# Patient Record
Sex: FEMALE | Race: BLACK OR AFRICAN AMERICAN | Employment: FULL TIME | URBAN - METROPOLITAN AREA
[De-identification: names, ages, dates, MRNs, and addresses within clinical notes are randomized per-mention and may not be internally consistent; named-entity substitution may affect disease eponyms.]

---

## 2020-07-17 ENCOUNTER — HOSPITAL ENCOUNTER (EMERGENCY)
Age: 35
Discharge: HOME | End: 2020-07-17
Attending: EMERGENCY MEDICINE
Payer: COMMERCIAL

## 2020-07-17 VITALS
DIASTOLIC BLOOD PRESSURE: 74 MMHG | OXYGEN SATURATION: 99 % | SYSTOLIC BLOOD PRESSURE: 127 MMHG | HEART RATE: 86 BPM | BODY MASS INDEX: 48.18 KG/M2 | TEMPERATURE: 98.8 F | HEIGHT: 61 IN | RESPIRATION RATE: 16 BRPM

## 2020-07-17 LAB
ANION GAP SERPL CALCULATED.3IONS-SCNC: 10 MMOL/L (ref 7–16)
BUN BLDV-MCNC: 11 MG/DL (ref 6–20)
CALCIUM SERPL-MCNC: 9 MG/DL (ref 8.6–10.2)
CHLORIDE BLD-SCNC: 105 MMOL/L (ref 98–107)
CO2: 25 MMOL/L (ref 22–29)
CREAT SERPL-MCNC: 0.6 MG/DL (ref 0.5–1)
D DIMER: <200 NG/ML DDU
GFR AFRICAN AMERICAN: >60
GFR NON-AFRICAN AMERICAN: >60 ML/MIN/1.73
GLUCOSE BLD-MCNC: 113 MG/DL (ref 74–99)
HCG(URINE) PREGNANCY TEST: NEGATIVE
POTASSIUM REFLEX MAGNESIUM: 3.9 MMOL/L (ref 3.5–5)
SODIUM BLD-SCNC: 140 MMOL/L (ref 132–146)

## 2020-07-17 PROCEDURE — 85025 COMPLETE CBC W/AUTO DIFF WBC: CPT

## 2020-07-17 PROCEDURE — 85378 FIBRIN DEGRADE SEMIQUANT: CPT

## 2020-07-17 PROCEDURE — 80048 BASIC METABOLIC PNL TOTAL CA: CPT

## 2020-07-17 PROCEDURE — 81025 URINE PREGNANCY TEST: CPT

## 2020-07-17 PROCEDURE — 99283 EMERGENCY DEPT VISIT LOW MDM: CPT

## 2020-07-17 PROCEDURE — 36415 COLL VENOUS BLD VENIPUNCTURE: CPT

## 2020-07-17 PROCEDURE — 6370000000 HC RX 637 (ALT 250 FOR IP): Performed by: EMERGENCY MEDICINE

## 2020-07-17 RX ORDER — DOXYCYCLINE HYCLATE 100 MG/1
100 CAPSULE ORAL 2 TIMES DAILY
COMMUNITY
End: 2021-04-22

## 2020-07-17 RX ORDER — FLUTICASONE PROPIONATE 50 MCG
2 SPRAY, SUSPENSION (ML) NASAL DAILY
COMMUNITY
End: 2021-04-22

## 2020-07-17 RX ORDER — CHOLECALCIFEROL (VITAMIN D3) 125 MCG
500 CAPSULE ORAL DAILY
COMMUNITY

## 2020-07-17 RX ORDER — MESALAMINE 1.2 G/1
1200 TABLET, DELAYED RELEASE ORAL
COMMUNITY
End: 2021-04-22

## 2020-07-17 RX ORDER — ACETAMINOPHEN 500 MG
1000 TABLET ORAL ONCE
Status: COMPLETED | OUTPATIENT
Start: 2020-07-17 | End: 2020-07-17

## 2020-07-17 RX ORDER — TRIAMCINOLONE ACETONIDE 1 MG/G
CREAM TOPICAL 2 TIMES DAILY
COMMUNITY
End: 2021-04-22

## 2020-07-17 RX ORDER — HYDROCODONE BITARTRATE AND ACETAMINOPHEN 5; 325 MG/1; MG/1
2 TABLET ORAL ONCE
Status: DISCONTINUED | OUTPATIENT
Start: 2020-07-17 | End: 2020-07-17

## 2020-07-17 RX ADMIN — ACETAMINOPHEN 1000 MG: 500 TABLET ORAL at 22:45

## 2020-07-17 ASSESSMENT — PAIN SCALES - GENERAL
PAINLEVEL_OUTOF10: 8
PAINLEVEL_OUTOF10: 6

## 2020-07-17 ASSESSMENT — PAIN DESCRIPTION - ORIENTATION: ORIENTATION: RIGHT

## 2020-07-17 ASSESSMENT — PAIN DESCRIPTION - LOCATION: LOCATION: ARM

## 2020-07-18 LAB
BASOPHILS ABSOLUTE: 0 E9/L (ref 0–0.2)
BASOPHILS RELATIVE PERCENT: 0 % (ref 0–2)
EOSINOPHILS ABSOLUTE: 0.18 E9/L (ref 0.05–0.5)
EOSINOPHILS RELATIVE PERCENT: 1 % (ref 0–6)
HCT VFR BLD CALC: 34.3 % (ref 34–48)
HEMOGLOBIN: 11.1 G/DL (ref 11.5–15.5)
LYMPHOCYTES ABSOLUTE: 5.34 E9/L (ref 1.5–4)
LYMPHOCYTES RELATIVE PERCENT: 30 % (ref 20–42)
MCH RBC QN AUTO: 25.9 PG (ref 26–35)
MCHC RBC AUTO-ENTMCNC: 32.4 % (ref 32–34.5)
MCV RBC AUTO: 80.1 FL (ref 80–99.9)
MONOCYTES ABSOLUTE: 1.07 E9/L (ref 0.1–0.95)
MONOCYTES RELATIVE PERCENT: 6 % (ref 2–12)
NEUTROPHILS ABSOLUTE: 11.21 E9/L (ref 1.8–7.3)
NEUTROPHILS RELATIVE PERCENT: 63 % (ref 43–80)
PDW BLD-RTO: 13.4 FL (ref 11.5–15)
PLATELET # BLD: 336 E9/L (ref 130–450)
PMV BLD AUTO: 9.6 FL (ref 7–12)
RBC # BLD: 4.28 E12/L (ref 3.5–5.5)
WBC # BLD: 17.8 E9/L (ref 4.5–11.5)

## 2020-07-18 NOTE — ED PROVIDER NOTES
HPI:  7/17/20, Time: 11:13 PM EDT        Yasmine Michelle is a 38 y.o. female presenting to the ED for RUE pain at a phlebotomy site, beginning 1 week ago. The complaint has been persistent, moderate in severity, and worsened by movement of the right arm and palpation of the area at times. She states pain does occasionally shoot up the right arm anteriorly but is primarily limited to the anterior elbow area. No chest pain or shortness breath no change in bowel bladder patterns no other complaints are reported. Patient was seen at urgent care and was advised to come here for evaluation for possible DVT of the upper extremity. Review of Systems:   Pertinent positives and negatives are stated within HPI, all other systems reviewed and are negative.    --------------------------------------------- PAST HISTORY ---------------------------------------------  Past Medical History:  has a past medical history of Colitis, ulcerative ([facility] [address] Past Surgical History:  has a past surgical history that includes Cholecystectomy. Social History:  reports that she has never smoked. She has never used smokeless tobacco. She reports that she does not drink alcohol or use drugs. Family History: family history is not on file. The patients home medications have been reviewed.     Allergies: Cefuroxime axetil; Nsaids; and Neosporin [bacitracin-neomycin-polymyxin]    -------------------------------------------------- RESULTS -------------------------------------------------  All laboratory and radiology results have been personally reviewed by myself   LABS:  Results for orders placed or performed during the hospital encounter of 07/17/20   D-Dimer, Quantitative   Result Value Ref Range    D-Dimer, Quant <200 ng/mL DDU   CBC Auto Differential   Result Value Ref Range    WBC 17.8 (H) 4.5 - 11.5 E9/L    RBC 4.28 3.50 - 5.50 E12/L    Hemoglobin 11.1 (L) 11.5 - 15.5 g/dL    Hematocrit 34.3 34.0 - 48.0 %    MCV 80.1 80.0 - 99.9 fL    MCH 25.9 (L) 26.0 - 35.0 pg    MCHC 32.4 32.0 - 34.5 %    RDW 13.4 11.5 - 15.0 fL    Platelets 408 276 - 837 E9/L    MPV 9.6 7.0 - 12.0 fL   Basic Metabolic Panel w/ Reflex to MG   Result Value Ref Range    Sodium 140 132 - 146 mmol/L    Potassium reflex Magnesium 3.9 3.5 - 5.0 mmol/L    Chloride 105 98 - 107 mmol/L    CO2 25 22 - 29 mmol/L    Anion Gap 10 7 - 16 mmol/L    Glucose 113 (H) 74 - 99 mg/dL    BUN 11 6 - 20 mg/dL    CREATININE 0.6 0.5 - 1.0 mg/dL    GFR Non-African American >60 >=60 mL/min/1.73    GFR African American >60     Calcium 9.0 8.6 - 10.2 mg/dL   Pregnancy, Urine   Result Value Ref Range    HCG(Urine) Pregnancy Test NEGATIVE NEGATIVE       RADIOLOGY:  Interpreted by Radiologist.  No orders to display       ------------------------- NURSING NOTES AND VITALS REVIEWED ---------------------------    The nursing notes within the ED encounter and vital signs as below have been reviewed. /74   Pulse 86   Temp 98.8 °F (37.1 °C) (Temporal)   Resp 16   Ht 5' 1\" (1.549 m)   LMP 07/03/2020   SpO2 99%   BMI 48.18 kg/m²   Oxygen Saturation Interpretation: Normal      ---------------------------------------------------PHYSICAL EXAM--------------------------------------      Constitutional/General: Alert and oriented x3, well appearing, non toxic in NAD  Head: Normocephalic and atraumatic  Eyes: PERRL, EOMI  Mouth: Oropharynx clear, handling secretions, no trismus  Neck: Supple, full ROM,   Pulmonary: Lungs clear to auscultation bilaterally, no wheezes, rales, or rhonchi. Not in respiratory distress  Cardiovascular:  Regular rate and rhythm, no murmurs, gallops, or rubs. 2+ distal pulses  Abdomen: Soft, non tender, non distended, no organomegaly no masses otherwise normal  Extremities: Moves all extremities x 4. Warm and well perfused; no erythema nor lymphangitic streaking nor any findings to suggest cellulitis. No clinical signs of an abscess.   There is no tenderness on palpation

## 2020-07-18 NOTE — ED NOTES
Nurse in to medicate patient- per patient \"I do not want norco,i want tylenol\" Dr Prashant Curtis notified and order changed      Luz Maria Brennan RN  07/17/20 9964

## 2021-04-22 ENCOUNTER — HOSPITAL ENCOUNTER (EMERGENCY)
Age: 36
Discharge: HOME | End: 2021-04-22
Attending: EMERGENCY MEDICINE
Payer: OTHER MISCELLANEOUS

## 2021-04-22 ENCOUNTER — APPOINTMENT (OUTPATIENT)
Dept: GENERAL RADIOLOGY | Age: 36
End: 2021-04-22
Payer: OTHER MISCELLANEOUS

## 2021-04-22 VITALS
DIASTOLIC BLOOD PRESSURE: 80 MMHG | HEART RATE: 74 BPM | RESPIRATION RATE: 18 BRPM | OXYGEN SATURATION: 100 % | SYSTOLIC BLOOD PRESSURE: 132 MMHG | TEMPERATURE: 97.7 F

## 2021-04-22 DIAGNOSIS — M79.10 MUSCLE SORENESS: ICD-10-CM
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
## 2021-04-22 PROCEDURE — 71046 X-RAY EXAM CHEST 2 VIEWS: CPT

## 2021-04-22 PROCEDURE — 73030 X-RAY EXAM OF SHOULDER: CPT

## 2021-04-22 PROCEDURE — 99283 EMERGENCY DEPT VISIT LOW MDM: CPT

## 2021-04-22 RX ORDER — NICOTINE POLACRILEX 2 MG
GUM BUCCAL
COMMUNITY

## 2021-04-22 ASSESSMENT — ENCOUNTER SYMPTOMS
SHORTNESS OF BREATH: 0
CHEST TIGHTNESS: 0
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
## 2022-01-02 ENCOUNTER — APPOINTMENT (OUTPATIENT)
Dept: GENERAL RADIOLOGY | Age: 37
End: 2022-01-02
Payer: COMMERCIAL

## 2022-01-02 ENCOUNTER — HOSPITAL ENCOUNTER (EMERGENCY)
Age: 37
Discharge: HOME | End: 2022-01-02
Attending: EMERGENCY MEDICINE
Payer: COMMERCIAL

## 2022-01-02 VITALS
RESPIRATION RATE: 16 BRPM | TEMPERATURE: 98.4 F | OXYGEN SATURATION: 98 % | BODY MASS INDEX: 50.03 KG/M2 | WEIGHT: 265 LBS | HEIGHT: 61 IN | DIASTOLIC BLOOD PRESSURE: 60 MMHG | SYSTOLIC BLOOD PRESSURE: 116 MMHG | HEART RATE: 79 BPM

## 2022-01-02 DIAGNOSIS — K21.9 GASTROESOPHAGEAL REFLUX DISEASE WITHOUT ESOPHAGITIS: ICD-10-CM

## 2022-01-02 DIAGNOSIS — M79.10 MYALGIA: ICD-10-CM

## 2022-01-02 DIAGNOSIS — R07.89 CHEST WALL PAIN: Primary | ICD-10-CM

## 2022-01-02 LAB
ANION GAP SERPL CALCULATED.3IONS-SCNC: 11 MMOL/L (ref 7–16)
BUN BLDV-MCNC: 6 MG/DL (ref 6–20)
CALCIUM SERPL-MCNC: 9.3 MG/DL (ref 8.6–10.2)
CHLORIDE BLD-SCNC: 103 MMOL/L (ref 98–107)
CO2: 24 MMOL/L (ref 22–29)
CREAT SERPL-MCNC: 0.5 MG/DL (ref 0.5–1)
GFR AFRICAN AMERICAN: >60
GFR NON-AFRICAN AMERICAN: >60 ML/MIN/1.73
GLUCOSE BLD-MCNC: 111 MG/DL (ref 74–99)
POTASSIUM SERPL-SCNC: 4.1 MMOL/L (ref 3.5–5)
SODIUM BLD-SCNC: 138 MMOL/L (ref 132–146)
TROPONIN, HIGH SENSITIVITY: <6 NG/L (ref 0–9)

## 2022-01-02 PROCEDURE — 80048 BASIC METABOLIC PNL TOTAL CA: CPT

## 2022-01-02 PROCEDURE — 84484 ASSAY OF TROPONIN QUANT: CPT

## 2022-01-02 PROCEDURE — 93005 ELECTROCARDIOGRAM TRACING: CPT | Performed by: EMERGENCY MEDICINE

## 2022-01-02 PROCEDURE — 2580000003 HC RX 258: Performed by: EMERGENCY MEDICINE

## 2022-01-02 PROCEDURE — 71045 X-RAY EXAM CHEST 1 VIEW: CPT

## 2022-01-02 PROCEDURE — 36415 COLL VENOUS BLD VENIPUNCTURE: CPT

## 2022-01-02 PROCEDURE — 99284 EMERGENCY DEPT VISIT MOD MDM: CPT

## 2022-01-02 RX ORDER — MULTIVIT WITH MINERALS/LUTEIN
250 TABLET ORAL DAILY
COMMUNITY

## 2022-01-02 RX ORDER — LANOLIN ALCOHOL/MO/W.PET/CERES
325 CREAM (GRAM) TOPICAL
COMMUNITY

## 2022-01-02 RX ORDER — ONDANSETRON 4 MG/1
8 TABLET, ORALLY DISINTEGRATING ORAL EVERY 8 HOURS PRN
Qty: 10 TABLET | Refills: 0 | Status: SHIPPED | OUTPATIENT
Start: 2022-01-02

## 2022-01-02 RX ORDER — 0.9 % SODIUM CHLORIDE 0.9 %
1000 INTRAVENOUS SOLUTION INTRAVENOUS ONCE
Status: COMPLETED | OUTPATIENT
Start: 2022-01-02 | End: 2022-01-02

## 2022-01-02 RX ORDER — FAMOTIDINE 20 MG/1
20 TABLET, FILM COATED ORAL 2 TIMES DAILY
Qty: 14 TABLET | Refills: 1 | Status: SHIPPED | OUTPATIENT
Start: 2022-01-02

## 2022-01-02 RX ORDER — HYDROCODONE BITARTRATE AND ACETAMINOPHEN 5; 325 MG/1; MG/1
1 TABLET ORAL EVERY 6 HOURS PRN
Qty: 10 TABLET | Refills: 0 | Status: SHIPPED | OUTPATIENT
Start: 2022-01-02 | End: 2022-01-05

## 2022-01-02 RX ADMIN — SODIUM CHLORIDE 1000 ML: 9 INJECTION, SOLUTION INTRAVENOUS at 01:46

## 2022-01-02 ASSESSMENT — PAIN DESCRIPTION - FREQUENCY: FREQUENCY: INTERMITTENT

## 2022-01-02 ASSESSMENT — PAIN DESCRIPTION - ONSET: ONSET: ON-GOING

## 2022-01-02 ASSESSMENT — PAIN DESCRIPTION - PAIN TYPE: TYPE: ACUTE PAIN

## 2022-01-02 ASSESSMENT — PAIN DESCRIPTION - PROGRESSION: CLINICAL_PROGRESSION: NOT CHANGED

## 2022-01-02 ASSESSMENT — PAIN DESCRIPTION - LOCATION: LOCATION: CHEST

## 2022-01-02 ASSESSMENT — PAIN SCALES - GENERAL: PAINLEVEL_OUTOF10: 5

## 2022-01-02 ASSESSMENT — PAIN DESCRIPTION - DESCRIPTORS: DESCRIPTORS: ACHING

## 2022-01-02 NOTE — Clinical Note
Maine Bland was seen and treated in our emergency department on 1/2/2022. She may return to work on 01/05/2022. If you have any questions or concerns, please don't hesitate to call.       Jairo Heaton MD

## 2022-01-02 NOTE — ED NOTES
Bed: 07  Expected date:   Expected time:   Means of arrival:   Comments:  [facility] - [address] [place], RN  01/02/22 8519

## 2022-01-02 NOTE — ED PROVIDER NOTES
HPI:  1/2/22, Time: 3:07 AM BRANDON Culp is a 39 y.o. female presenting to the ED for chest pain, beginning 4 to 6 hours ago. The complaint has been intermittent, moderate in severity, and worsened by nothing. Patient does not report any pain worsened with exertion. She not had any cough no colored sputum production, no fever/chills, no pain radiating to the neck/jaw nor to the left arm nor straight through to the back. Patient's not on any estrogen hormones and she is not had any pain with inspiration, no hemoptysis. No relieving factors reported. No other complaints    Review of Systems:   A complete review of systems was performed and pertinent positives and negatives are stated within HPI, all other systems reviewed and are negative.    --------------------------------------------- PAST HISTORY ---------------------------------------------  Past Medical History:  has a past medical history of Colitis, ulcerative ([facility] [address] Past Surgical History:  has a past surgical history that includes Cholecystectomy. Social History:  reports that she has never smoked. She has never used smokeless tobacco. She reports that she does not drink alcohol and does not use drugs. Family History: family history is not on file. The patients home medications have been reviewed.     Allergies: Cefuroxime axetil, Toradol [ketorolac tromethamine], and Neosporin [bacitracin-neomycin-polymyxin]    -------------------------------------------------- RESULTS -------------------------------------------------  All laboratory and radiology results have been personally reviewed by myself   LABS:  Results for orders placed or performed during the hospital encounter of 01/02/22   Troponin   Result Value Ref Range    Troponin, High Sensitivity <6 0 - 9 ng/L   Basic Metabolic Panel   Result Value Ref Range    Sodium 138 132 - 146 mmol/L    Potassium 4.1 3.5 - 5.0 mmol/L    Chloride 103 98 - 107 mmol/L    CO2 24 22 - 29 mmol/L    Anion Gap 11 7 - 16 mmol/L    Glucose 111 (H) 74 - 99 mg/dL    BUN 6 6 - 20 mg/dL    CREATININE 0.5 0.5 - 1.0 mg/dL    GFR Non-African American >60 >=60 mL/min/1.73    GFR African American >60     Calcium 9.3 8.6 - 10.2 mg/dL   EKG 12 Lead   Result Value Ref Range    Ventricular Rate 83 BPM    Atrial Rate 83 BPM    P-R Interval 176 ms    QRS Duration 74 ms    Q-T Interval 382 ms    QTc Calculation (Bazett) 448 ms    P Axis 75 degrees    R Axis 61 degrees    T Axis 33 degrees       RADIOLOGY:  Interpreted by Radiologist.  XR CHEST PORTABLE   Final Result   No acute process. ------------------------- NURSING NOTES AND VITALS REVIEWED ---------------------------   The nursing notes within the ED encounter and vital signs as below have been reviewed. BP (!) 112/55   Pulse 80   Temp 97.9 °F (36.6 °C) (Temporal)   Resp 16   Ht 5' 1.25\" (1.556 m)   Wt 265 lb (120.2 kg)   SpO2 98%   BMI 49.66 kg/m² Oxygen Saturation Interpretation: Normal      ---------------------------------------------------PHYSICAL EXAM--------------------------------------      Constitutional/General: Alert and oriented x3, well appearing, non toxic in NAD  Head: Normocephalic and atraumatic  Eyes: PERRL, EOMI  Mouth: Oropharynx clear, handling secretions, no trismus  Neck: Supple, full ROM,   Pulmonary: Lungs clear to auscultation bilaterally, no wheezes, rales, or rhonchi. Not in respiratory distress  Cardiovascular:  Regular rate and rhythm, no murmurs, gallops, or rubs. 2+ distal pulses  Abdomen: Soft, non tender, non distended,   Extremities: Moves all extremities x 4.  Warm and well perfused  Skin: warm and dry without rash  Neurologic: GCS 15,  Psych: Normal Affect      ------------------------------ ED COURSE/MEDICAL DECISION MAKING----------------------  Medications   0.9 % sodium chloride bolus (1,000 mLs IntraVENous New Bag 1/2/22 0146)         ED COURSE:     Medical Decision Making:   Patient screening EKG was nondiagnostic for ischemia and the patient's HEAR risk score was 2-3 points, low risk. Patient's well score for PE equals 0 points, low risk score; PERC negative. Chest x-ray showed no acute process; specifically no pneumothorax no focal infiltrates and no mediastinal abnormalities to suggest aortic dissection. Counseling: The emergency provider has spoken with the patient and discussed todays results, in addition to providing specific details for the plan of care and counseling regarding the diagnosis and prognosis. Questions are answered at this time and they are agreeable with the plan. Controlled Substance Monitoring:  Acute and Chronic Pain Monitoring:   RX Monitoring 1/2/2022   Periodic Controlled Substance Monitoring No signs of potential drug abuse or diversion identified.       --------------------------------- IMPRESSION AND DISPOSITION ---------------------------------    IMPRESSION  1. Chest wall pain    2. Gastroesophageal reflux disease without esophagitis    3. Myalgia        DISPOSITION  Disposition: Discharge to home  Patient condition is stable      NOTE: This report was transcribed using voice recognition software.  Every effort was made to ensure accuracy; however, inadvertent computerized transcription errors may be present        Ammy Lua MD  01/05/22 4754

## 2022-01-03 LAB
EKG ATRIAL RATE: 83 BPM
EKG P AXIS: 75 DEGREES
EKG P-R INTERVAL: 176 MS
EKG Q-T INTERVAL: 382 MS
EKG QRS DURATION: 74 MS
EKG QTC CALCULATION (BAZETT): 448 MS
EKG R AXIS: 61 DEGREES
EKG T AXIS: 33 DEGREES
EKG VENTRICULAR RATE: 83 BPM

## 2022-06-03 ENCOUNTER — NURSE TRIAGE (OUTPATIENT)
Dept: OTHER | Facility: CLINIC | Age: 37
End: 2022-06-03

## 2022-06-03 NOTE — TELEPHONE ENCOUNTER
Subjective: Caller states \"pain\"     Current Symptoms:   Was something and having some pain in shoulder (L)  Radiating to arm  + numbness, tingling, limited ROM on that side     Onset:   3 days     Associated Symptoms: na     Pain Severity: 9/10    Temperature:  Na     What has been tried: Chiropractor, Ice, Ibuprofen, Naproxen     LMP: na  Pregnant: na     Recommended disposition: Go to ED/U[facility] Now (Or to Office with PCP Approval)    Care advice provided, patient verbalizes understanding; denies any other questions or concerns; instructed to call back for any new or worsening symptoms. UCC tonight for evaluation  Ice then heat   Ibuprofen, Tylenol, Naproxen   Call back for worsening symptoms    This triage is a result of a call to [address] [place] [address]. Please do not respond to the triage nurse through this encounter. Any subsequent communication should be directly with the patient.       Reason for Disposition   [1] SEVERE pain AND [2] not improved 2 hours after pain medicine/ice packs    Protocols used: SHOULDER INJURY-ADULT-

## 2022-07-27 ENCOUNTER — NURSE TRIAGE (OUTPATIENT)
Dept: OTHER | Facility: CLINIC | Age: 37
End: 2022-07-27

## 2022-07-27 NOTE — TELEPHONE ENCOUNTER
Subjective: Caller states \"I am having pain in my lower left back\"     Current Symptoms: Does not feel muscular. Pain is above her L hip and wraps around to her abdomen. No injury. Band on her dress was causing pain just by touching that area. Band was not tight, had to change clothes. Even without pressure on the area now, pain is still present. No bruising or swelling. Onset: a few months ago; worsening, waxing and waning    Associated Symptoms: NA    Pain Severity: 7.5/10; throbbing; moderate    Temperature: denies fever     What has been tried: Chiropractor    LMP: NA Pregnant: No    Recommended disposition: See PCP within 3 Days    Care advice provided, patient verbalizes understanding; denies any other questions or concerns; instructed to call back for any new or worsening symptoms. Patient/caller agrees to proceed to nearest [facility] . Patient asked to find out which UCC is covered under her plan. Provided patient with customer support number for [employer]. This triage is a result of a call to [address] [place] [address]. Please do not respond to the triage nurse through this encounter. Any subsequent communication should be directly with the patient.     Reason for Disposition   MODERATE back pain (e.g., interferes with normal activities) and present > 3 days    Protocols used: Back Pain-ADULT-[place]

## 2022-11-26 ENCOUNTER — NURSE TRIAGE (OUTPATIENT)
Dept: OTHER | Facility: CLINIC | Age: 37
End: 2022-11-26

## 2022-11-26 NOTE — TELEPHONE ENCOUNTER
Location of patient: [facility]    Subjective: Caller states \"hemorrhoids\"     Current Symptoms: rectal pain, BM are painful, hard to walk and move when she is having pain. Has episodes of pain. Has UC as well. Denies bleeding, mucous in her stool. Chronic issue that is now flaring    Onset: 3 days ago; sudden    Associated Symptoms: diarrhea    Pain Severity: 8/10; sharp, throbbing, pulsating; intermittent    Temperature: n/a     What has been tried: suppositories, cream, otc digestive enzymes    LMP:  2 weeks ago  Pregnant: No    Recommended disposition: See HCP within 4 Hours (or PCP triage)    Care advice provided, patient verbalizes understanding; denies any other questions or concerns; instructed to call back for any new or worsening symptoms. Patient/caller agrees to follow-up with PCP , to call the on-call for her PCP, or to go to [facility]. She was also told she could consult pharmacist.    This triage is a result of a call to [address] [place] [address]. Please do not respond to the triage nurse through this encounter. Any subsequent communication should be directly with the patient.         Reason for Disposition   SEVERE rectal pain (e.g., excruciating, unable to have a bowel movement)    Protocols used: Rectal Symptoms-ADULT-[place]

## 2023-05-19 PROBLEM — E55.9 VITAMIN D DEFICIENCY: Status: ACTIVE | Noted: 2023-05-19

## 2023-05-19 PROBLEM — G93.5 CHIARI I MALFORMATION (MULTI): Status: ACTIVE | Noted: 2023-05-19

## 2023-05-19 PROBLEM — D49.6 BRAIN TUMOR (MULTI): Status: ACTIVE | Noted: 2023-05-19

## 2023-05-19 PROBLEM — G93.2: Status: ACTIVE | Noted: 2023-05-19

## 2023-05-19 PROBLEM — L52 ERYTHEMA NODOSUM: Status: ACTIVE | Noted: 2023-05-19

## 2023-05-19 PROBLEM — K51.90 ULCERATIVE COLITIS (MULTI): Status: ACTIVE | Noted: 2023-05-19

## 2023-05-19 PROBLEM — R93.0 ABNORMAL CT SCAN, SINUS: Status: ACTIVE | Noted: 2023-05-19

## 2023-05-19 PROBLEM — L73.2 HIDRADENITIS SUPPURATIVA: Status: ACTIVE | Noted: 2023-05-19

## 2023-05-19 PROBLEM — F32.A ANXIETY AND DEPRESSION: Status: ACTIVE | Noted: 2023-05-19

## 2023-05-19 PROBLEM — K86.81 EXOCRINE PANCREATIC INSUFFICIENCY (HHS-HCC): Status: ACTIVE | Noted: 2023-05-19

## 2023-05-19 PROBLEM — M26.652 ARTHROPATHY OF LEFT TEMPOROMANDIBULAR JOINT: Status: ACTIVE | Noted: 2023-05-19

## 2023-05-19 PROBLEM — F41.9 ANXIETY AND DEPRESSION: Status: ACTIVE | Noted: 2023-05-19

## 2023-05-19 PROBLEM — G93.89 CALCIFICATION OF BRAIN: Status: ACTIVE | Noted: 2023-05-19

## 2023-05-19 PROBLEM — D64.9 CHRONIC ANEMIA: Status: ACTIVE | Noted: 2023-05-19

## 2023-05-19 PROBLEM — R73.9 HYPERGLYCEMIA: Status: ACTIVE | Noted: 2023-05-19

## 2023-05-19 PROBLEM — K92.1 HEMATOCHEZIA: Status: ACTIVE | Noted: 2023-05-19

## 2023-05-19 PROBLEM — R60.0 BILATERAL EDEMA OF LOWER EXTREMITY: Status: ACTIVE | Noted: 2023-05-19

## 2023-05-19 PROBLEM — R19.7 DIARRHEA: Status: ACTIVE | Noted: 2023-05-19

## 2023-05-19 PROBLEM — R07.89 ATYPICAL CHEST PAIN: Status: ACTIVE | Noted: 2023-05-19

## 2023-05-19 PROBLEM — E78.5 HYPERLIPIDEMIA, MILD: Status: ACTIVE | Noted: 2023-05-19

## 2023-05-19 PROBLEM — D72.829 LEUKOCYTOSIS: Status: ACTIVE | Noted: 2023-05-19

## 2023-05-19 PROBLEM — G93.2 PSEUDOTUMOR CEREBRI: Status: ACTIVE | Noted: 2023-05-19

## 2023-05-19 RX ORDER — LANOLIN ALCOHOL/MO/W.PET/CERES
1 CREAM (GRAM) TOPICAL DAILY
COMMUNITY

## 2023-05-19 RX ORDER — ACETAMINOPHEN, DEXTROMETHORPHAN HBR, DOXYLAMINE SUCCINATE, PHENYLEPHRINE HCL 650; 20; 12.5; 1 MG/30ML; MG/30ML; MG/30ML; MG/30ML
1 SOLUTION ORAL DAILY
COMMUNITY

## 2023-05-19 RX ORDER — MESALAMINE 0.38 G/1
1.5 CAPSULE, EXTENDED RELEASE ORAL DAILY
COMMUNITY
Start: 2022-10-20

## 2023-05-19 RX ORDER — CHOLECALCIFEROL (VITAMIN D3) 125 MCG
1 CAPSULE ORAL DAILY
COMMUNITY

## 2023-05-23 ENCOUNTER — OFFICE VISIT (OUTPATIENT)
Dept: PRIMARY CARE | Facility: CLINIC | Age: 38
End: 2023-05-23
Payer: COMMERCIAL

## 2023-05-23 ENCOUNTER — LAB (OUTPATIENT)
Dept: LAB | Facility: LAB | Age: 38
End: 2023-05-23
Payer: COMMERCIAL

## 2023-05-23 VITALS
BODY MASS INDEX: 48.9 KG/M2 | RESPIRATION RATE: 17 BRPM | OXYGEN SATURATION: 99 % | DIASTOLIC BLOOD PRESSURE: 80 MMHG | HEART RATE: 67 BPM | TEMPERATURE: 98 F | WEIGHT: 258.8 LBS | SYSTOLIC BLOOD PRESSURE: 124 MMHG

## 2023-05-23 DIAGNOSIS — E78.5 HYPERLIPIDEMIA, MILD: ICD-10-CM

## 2023-05-23 DIAGNOSIS — M25.562 ARTHRALGIA OF LEFT KNEE: ICD-10-CM

## 2023-05-23 DIAGNOSIS — R73.9 HYPERGLYCEMIA: ICD-10-CM

## 2023-05-23 DIAGNOSIS — N94.6 DYSMENORRHEA: ICD-10-CM

## 2023-05-23 DIAGNOSIS — R92.8 ABNORMAL MAMMOGRAM OF LEFT BREAST: ICD-10-CM

## 2023-05-23 DIAGNOSIS — R92.8 ABNORMAL MAMMOGRAM OF BOTH BREASTS: ICD-10-CM

## 2023-05-23 DIAGNOSIS — N63.21 MASS OF UPPER OUTER QUADRANT OF LEFT BREAST: Primary | ICD-10-CM

## 2023-05-23 LAB
ALANINE AMINOTRANSFERASE (SGPT) (U/L) IN SER/PLAS: 10 U/L (ref 7–45)
ALBUMIN (G/DL) IN SER/PLAS: 4.2 G/DL (ref 3.4–5)
ALKALINE PHOSPHATASE (U/L) IN SER/PLAS: 80 U/L (ref 33–110)
ANION GAP IN SER/PLAS: 14 MMOL/L (ref 10–20)
ASPARTATE AMINOTRANSFERASE (SGOT) (U/L) IN SER/PLAS: 19 U/L (ref 9–39)
BASOPHILS (10*3/UL) IN BLOOD BY AUTOMATED COUNT: 0.05 X10E9/L (ref 0–0.1)
BASOPHILS/100 LEUKOCYTES IN BLOOD BY AUTOMATED COUNT: 0.4 % (ref 0–2)
BILIRUBIN TOTAL (MG/DL) IN SER/PLAS: 0.8 MG/DL (ref 0–1.2)
C REACTIVE PROTEIN (MG/L) IN SER/PLAS: 0.97 MG/DL
CALCIUM (MG/DL) IN SER/PLAS: 9.2 MG/DL (ref 8.6–10.6)
CARBON DIOXIDE, TOTAL (MMOL/L) IN SER/PLAS: 28 MMOL/L (ref 21–32)
CHLORIDE (MMOL/L) IN SER/PLAS: 101 MMOL/L (ref 98–107)
CHOLESTEROL (MG/DL) IN SER/PLAS: 137 MG/DL (ref 0–199)
CHOLESTEROL IN HDL (MG/DL) IN SER/PLAS: 47.5 MG/DL
CHOLESTEROL/HDL RATIO: 2.9
CREATININE (MG/DL) IN SER/PLAS: 0.45 MG/DL (ref 0.5–1.05)
EOSINOPHILS (10*3/UL) IN BLOOD BY AUTOMATED COUNT: 0.05 X10E9/L (ref 0–0.7)
EOSINOPHILS/100 LEUKOCYTES IN BLOOD BY AUTOMATED COUNT: 0.4 % (ref 0–6)
ERYTHROCYTE DISTRIBUTION WIDTH (RATIO) BY AUTOMATED COUNT: 13.6 % (ref 11.5–14.5)
ERYTHROCYTE MEAN CORPUSCULAR HEMOGLOBIN CONCENTRATION (G/DL) BY AUTOMATED: 31 G/DL (ref 32–36)
ERYTHROCYTE MEAN CORPUSCULAR VOLUME (FL) BY AUTOMATED COUNT: 85 FL (ref 80–100)
ERYTHROCYTES (10*6/UL) IN BLOOD BY AUTOMATED COUNT: 4.35 X10E12/L (ref 4–5.2)
ESTIMATED AVERAGE GLUCOSE FOR HBA1C: 117 MG/DL
FOLLITROPIN (IU/L) IN SER/PLAS: 10.3 IU/L
GFR FEMALE: >90 ML/MIN/1.73M2
GLUCOSE (MG/DL) IN SER/PLAS: 76 MG/DL (ref 74–99)
HEMATOCRIT (%) IN BLOOD BY AUTOMATED COUNT: 36.8 % (ref 36–46)
HEMOGLOBIN (G/DL) IN BLOOD: 11.4 G/DL (ref 12–16)
HEMOGLOBIN A1C/HEMOGLOBIN TOTAL IN BLOOD: 5.7 %
IMMATURE GRANULOCYTES/100 LEUKOCYTES IN BLOOD BY AUTOMATED COUNT: 0.2 % (ref 0–0.9)
LDL: 80 MG/DL (ref 0–99)
LEUKOCYTES (10*3/UL) IN BLOOD BY AUTOMATED COUNT: 12.8 X10E9/L (ref 4.4–11.3)
LUTEINIZING HORMONE (IU/ML) IN SER/PLAS: 12.7 IU/L
LYMPHOCYTES (10*3/UL) IN BLOOD BY AUTOMATED COUNT: 4.28 X10E9/L (ref 1.2–4.8)
LYMPHOCYTES/100 LEUKOCYTES IN BLOOD BY AUTOMATED COUNT: 33.5 % (ref 13–44)
MONOCYTES (10*3/UL) IN BLOOD BY AUTOMATED COUNT: 0.71 X10E9/L (ref 0.1–1)
MONOCYTES/100 LEUKOCYTES IN BLOOD BY AUTOMATED COUNT: 5.6 % (ref 2–10)
NEUTROPHILS (10*3/UL) IN BLOOD BY AUTOMATED COUNT: 7.66 X10E9/L (ref 1.2–7.7)
NEUTROPHILS/100 LEUKOCYTES IN BLOOD BY AUTOMATED COUNT: 59.9 % (ref 40–80)
NRBC (PER 100 WBCS) BY AUTOMATED COUNT: 0 /100 WBC (ref 0–0)
PLATELETS (10*3/UL) IN BLOOD AUTOMATED COUNT: 343 X10E9/L (ref 150–450)
POTASSIUM (MMOL/L) IN SER/PLAS: 3.5 MMOL/L (ref 3.5–5.3)
PROLACTIN (UG/L) IN SER/PLAS: 10.5 UG/L (ref 3–20)
PROTEIN TOTAL: 7.7 G/DL (ref 6.4–8.2)
RHEUMATOID FACTOR (IU/ML) IN SERUM OR PLASMA: <10 IU/ML (ref 0–15)
SEDIMENTATION RATE, ERYTHROCYTE: 58 MM/H (ref 0–20)
SODIUM (MMOL/L) IN SER/PLAS: 139 MMOL/L (ref 136–145)
TRIGLYCERIDE (MG/DL) IN SER/PLAS: 50 MG/DL (ref 0–149)
URATE (MG/DL) IN SER/PLAS: 4.9 MG/DL (ref 2.3–6.7)
UREA NITROGEN (MG/DL) IN SER/PLAS: 7 MG/DL (ref 6–23)
VLDL: 10 MG/DL (ref 0–40)

## 2023-05-23 PROCEDURE — 86140 C-REACTIVE PROTEIN: CPT

## 2023-05-23 PROCEDURE — 84146 ASSAY OF PROLACTIN: CPT

## 2023-05-23 PROCEDURE — 84402 ASSAY OF FREE TESTOSTERONE: CPT

## 2023-05-23 PROCEDURE — 85025 COMPLETE CBC W/AUTO DIFF WBC: CPT

## 2023-05-23 PROCEDURE — 80053 COMPREHEN METABOLIC PANEL: CPT

## 2023-05-23 PROCEDURE — 83002 ASSAY OF GONADOTROPIN (LH): CPT

## 2023-05-23 PROCEDURE — 3008F BODY MASS INDEX DOCD: CPT | Performed by: FAMILY MEDICINE

## 2023-05-23 PROCEDURE — 99214 OFFICE O/P EST MOD 30 MIN: CPT | Performed by: FAMILY MEDICINE

## 2023-05-23 PROCEDURE — 83036 HEMOGLOBIN GLYCOSYLATED A1C: CPT

## 2023-05-23 PROCEDURE — 85652 RBC SED RATE AUTOMATED: CPT

## 2023-05-23 PROCEDURE — 83001 ASSAY OF GONADOTROPIN (FSH): CPT

## 2023-05-23 PROCEDURE — 1036F TOBACCO NON-USER: CPT | Performed by: FAMILY MEDICINE

## 2023-05-23 PROCEDURE — 36415 COLL VENOUS BLD VENIPUNCTURE: CPT

## 2023-05-23 PROCEDURE — 84403 ASSAY OF TOTAL TESTOSTERONE: CPT

## 2023-05-23 PROCEDURE — 86431 RHEUMATOID FACTOR QUANT: CPT

## 2023-05-23 PROCEDURE — 86038 ANTINUCLEAR ANTIBODIES: CPT

## 2023-05-23 PROCEDURE — 80061 LIPID PANEL: CPT

## 2023-05-23 PROCEDURE — 84550 ASSAY OF BLOOD/URIC ACID: CPT

## 2023-05-23 RX ORDER — NICOTINE POLACRILEX 2 MG
1 GUM BUCCAL DAILY
COMMUNITY

## 2023-05-23 ASSESSMENT — PATIENT HEALTH QUESTIONNAIRE - PHQ9
SUM OF ALL RESPONSES TO PHQ9 QUESTIONS 1 AND 2: 0
2. FEELING DOWN, DEPRESSED OR HOPELESS: NOT AT ALL
1. LITTLE INTEREST OR PLEASURE IN DOING THINGS: NOT AT ALL

## 2023-05-23 NOTE — PROGRESS NOTES
Subjective   Patient ID: Radha Smith is a 38 y.o. female who presents for Mass (Near LT outer breast).    Here with c/o multiple    Lump under her left axilla for the past month  Non tender  Not red or warm    Has arthritis all over  Seeing PT for pelvic floor 2 times a week  Seeing chiropractor and was told should get inflammatory markers checked  Knee and low back pain off and on for a few years.       New GI doc and doing well  Mild UC and pancreatitis insufficency  Seeing OB regular            Review of Systems    Objective   /80   Pulse 67   Temp 36.7 °C (98 °F)   Resp 17   Wt 117 kg (258 lb 12.8 oz)   SpO2 99%   BMI 48.90 kg/m²     Physical Exam  Vitals reviewed.   Constitutional:       Appearance: Normal appearance.   Chest:      Comments: Left axilla, upper outer breast kidney bean size mass, non-tender  Neurological:      Mental Status: She is alert.         Assessment/Plan   Problem List Items Addressed This Visit          Other    Hyperglycemia    Relevant Orders    Hemoglobin A1C    Hyperlipidemia, mild    Relevant Orders    CBC and Auto Differential    Comprehensive Metabolic Panel    Lipid Panel     Other Visit Diagnoses       Mass of upper outer quadrant of left breast    -  Primary    Checking diagnostic mammogram and ultrasound and treat accordingly    Relevant Orders    BI mammo bilateral diagnostic    BI US breast limited left    Arthralgia of left knee        checking arthritis panel at chiropractor request  Referral to PT    Relevant Orders    Uric Acid    C-Reactive Protein    Sedimentation Rate    DAYSI with Reflex to CHRISTIANO    Rheumatoid Factor    Referral to Physical Therapy    Abnormal mammogram of both breasts        Relevant Orders    BI mammo bilateral diagnostic    Abnormal mammogram of left breast        Relevant Orders    BI US breast limited left    Dysmenorrhea        checking hormone levels at PT request    Relevant Orders    FSH & LH    Testosterone,Free and Total     Prolactin

## 2023-05-24 LAB — ANTI-NUCLEAR ANTIBODY (ANA): NEGATIVE

## 2023-05-31 LAB
TESTOSTERONE FREE (CHAN): 3.9 PG/ML (ref 0.1–6.4)
TESTOSTERONE,TOTAL,LC-MS/MS: 42 NG/DL (ref 2–45)

## 2023-06-12 ENCOUNTER — TELEPHONE (OUTPATIENT)
Dept: PRIMARY CARE | Facility: CLINIC | Age: 38
End: 2023-06-12

## 2023-06-12 NOTE — TELEPHONE ENCOUNTER
Pt akiko vivas CT scan ordered- states spoke with GI provider and believes its a GI issues but insure where. Gertrudis comer

## 2023-06-20 DIAGNOSIS — R92.8 ABNORMAL MAMMOGRAM OF LEFT BREAST: Primary | ICD-10-CM

## 2023-07-04 ENCOUNTER — NURSE TRIAGE (OUTPATIENT)
Dept: OTHER | Facility: CLINIC | Age: 38
End: 2023-07-04
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
## 2023-07-11 ENCOUNTER — OFFICE VISIT (OUTPATIENT)
Dept: PRIMARY CARE | Facility: CLINIC | Age: 38
End: 2023-07-11
Payer: COMMERCIAL

## 2023-07-11 VITALS
WEIGHT: 263.6 LBS | DIASTOLIC BLOOD PRESSURE: 76 MMHG | SYSTOLIC BLOOD PRESSURE: 108 MMHG | HEART RATE: 79 BPM | BODY MASS INDEX: 49.81 KG/M2 | RESPIRATION RATE: 16 BRPM | TEMPERATURE: 98.6 F | OXYGEN SATURATION: 99 %

## 2023-07-11 DIAGNOSIS — H65.02 NON-RECURRENT ACUTE SEROUS OTITIS MEDIA OF LEFT EAR: Primary | ICD-10-CM

## 2023-07-11 PROCEDURE — 3008F BODY MASS INDEX DOCD: CPT | Performed by: FAMILY MEDICINE

## 2023-07-11 PROCEDURE — 1036F TOBACCO NON-USER: CPT | Performed by: FAMILY MEDICINE

## 2023-07-11 PROCEDURE — 99213 OFFICE O/P EST LOW 20 MIN: CPT | Performed by: FAMILY MEDICINE

## 2023-07-11 RX ORDER — AMOXICILLIN AND CLAVULANATE POTASSIUM 875; 125 MG/1; MG/1
875 TABLET, FILM COATED ORAL 2 TIMES DAILY
Qty: 20 TABLET | Refills: 0 | Status: SHIPPED | OUTPATIENT
Start: 2023-07-11 | End: 2023-07-21

## 2023-07-11 ASSESSMENT — PATIENT HEALTH QUESTIONNAIRE - PHQ9
1. LITTLE INTEREST OR PLEASURE IN DOING THINGS: NOT AT ALL
2. FEELING DOWN, DEPRESSED OR HOPELESS: NOT AT ALL
SUM OF ALL RESPONSES TO PHQ9 QUESTIONS 1 AND 2: 0

## 2023-07-11 NOTE — PROGRESS NOTES
Subjective   Patient ID: Radha Smith is a 38 y.o. female who presents for Earache (Bilateral X 11 days).    Here with complaints of bilateral ear pressure and dizziness for the past 10 days.    Feeling like a bouey on the water  Dizzy, swaying for the past 10 days    [facility]  Given antivert and flonase and decongestant  Still with issues  Back to work yesterday and difficulty going up steps  Ears are itching  Denies cough, sinus drainage or fevers  Denies chest heaviness  Headaches off and on.     Earache        Review of Systems   HENT:  Positive for ear pain.        Objective   /76   Pulse 79   Temp 37 °C (98.6 °F)   Resp 16   SpO2 99%     Physical Exam  HENT:      Head: Normocephalic.      Right Ear: Hearing, tympanic membrane and ear canal normal.      Left Ear: Hearing and ear canal normal. Drainage present. A middle ear effusion is present.      Nose: Nose normal.      Mouth/Throat:      Lips: Pink.      Mouth: Mucous membranes are moist.   Cardiovascular:      Rate and Rhythm: Normal rate and regular rhythm.   Pulmonary:      Effort: Pulmonary effort is normal.      Breath sounds: Normal breath sounds and air entry.   Musculoskeletal:      Cervical back: Full passive range of motion without pain, normal range of motion and neck supple.       Assessment/Plan   Problem List Items Addressed This Visit    None  Visit Diagnoses       Non-recurrent acute serous otitis media of left ear    -  Primary    Covering with Augmentin  Use and side effect profile reviewed.  Continue OTC meds and general supportive care.  Return if not improving    Relevant Medications    amoxicillin-pot clavulanate (Augmentin) 875-125 mg tablet

## 2023-10-09 ENCOUNTER — ANCILLARY PROCEDURE (OUTPATIENT)
Dept: RADIOLOGY | Facility: CLINIC | Age: 38
End: 2023-10-09
Payer: COMMERCIAL

## 2023-10-09 ENCOUNTER — OFFICE VISIT (OUTPATIENT)
Dept: SURGICAL ONCOLOGY | Facility: CLINIC | Age: 38
End: 2023-10-09
Payer: COMMERCIAL

## 2023-10-09 VITALS
HEART RATE: 77 BPM | SYSTOLIC BLOOD PRESSURE: 115 MMHG | HEIGHT: 62 IN | BODY MASS INDEX: 48.81 KG/M2 | WEIGHT: 265.21 LBS | DIASTOLIC BLOOD PRESSURE: 77 MMHG

## 2023-10-09 DIAGNOSIS — N60.02 BREAST CYST, LEFT: Primary | ICD-10-CM

## 2023-10-09 DIAGNOSIS — Z80.3 FAMILY HISTORY OF BREAST CANCER: ICD-10-CM

## 2023-10-09 DIAGNOSIS — R92.8 OTHER ABNORMAL AND INCONCLUSIVE FINDINGS ON DIAGNOSTIC IMAGING OF BREAST: ICD-10-CM

## 2023-10-09 DIAGNOSIS — R92.8 ABNORMAL MAMMOGRAM OF LEFT BREAST: ICD-10-CM

## 2023-10-09 PROCEDURE — 1036F TOBACCO NON-USER: CPT | Performed by: NURSE PRACTITIONER

## 2023-10-09 PROCEDURE — 99214 OFFICE O/P EST MOD 30 MIN: CPT | Performed by: NURSE PRACTITIONER

## 2023-10-09 PROCEDURE — 76642 ULTRASOUND BREAST LIMITED: CPT | Mod: LEFT SIDE | Performed by: RADIOLOGY

## 2023-10-09 PROCEDURE — 3008F BODY MASS INDEX DOCD: CPT | Performed by: NURSE PRACTITIONER

## 2023-10-09 PROCEDURE — 76642 ULTRASOUND BREAST LIMITED: CPT | Mod: LT

## 2023-10-09 PROCEDURE — 99204 OFFICE O/P NEW MOD 45 MIN: CPT | Performed by: NURSE PRACTITIONER

## 2023-10-09 ASSESSMENT — ENCOUNTER SYMPTOMS
LOSS OF SENSATION IN FEET: 0
DEPRESSION: 0
OCCASIONAL FEELINGS OF UNSTEADINESS: 0

## 2023-10-09 ASSESSMENT — PAIN SCALES - GENERAL: PAINLEVEL: 0-NO PAIN

## 2023-10-09 NOTE — PROGRESS NOTES
Radha Smith female   1985 38 y.o.   [id number]      Chief Complaint    New Patient Visit          [facility]  Radha Smith is a 38 y.o.  female referred by Adelina Kee Pla, DO  to the [facility] for left breast mass. She denies breast surgery or biopsy.     BREAST IMAGIN2023 bilateral diagnostic mammogram and ultrasound indicated BI-RADS Category 3, normal left axillary lymph node to area of palpable concern, left breast probably benign cyst cluster 3:00 11 cm from nipple.  Short-term follow-up recommended.  10/9/2023 left breast ultrasound BI-RADS Category 3, stable probably benign breast cyst cluster 3:00 11 cm from the nipple 6 x 4 x 5 mm.    REPRODUCTIVE HISTORY: menarche age 10, nullipara  premenopausal,  scattered breast tissue     FAMILY CANCER HISTORY:   Mother: breast cancer age 35  Maternal aunt: breast cancer age 25  Maternal grandfather: testicular cancer age 50  Maternal Uncle: lung cancer 60s  Maternal cousin: ovarian cancer 40s      REVIEW OF SYSTEMS    Constitutional:  Negative for appetite change, fatigue, fever and unexpected weight change.   HENT:  Negative for ear pain, hearing loss, nosebleeds, sore throat and trouble swallowing.    Eyes:  Negative for discharge, itching and visual disturbance.   Respiratory:  Negative for cough, chest tightness and shortness of breath.    Cardiovascular:  Negative for chest pain, palpitations and leg swelling.   Breast: as indicated in HPI  Gastrointestinal:  Negative for abdominal pain, constipation, diarrhea and nausea.   Endocrine: Negative for cold intolerance and heat intolerance.   Genitourinary:  Negative for dysuria, frequency, hematuria, pelvic pain and vaginal bleeding.   Musculoskeletal:  Negative for arthralgias, back pain, gait problem, joint swelling and myalgias.   Skin:  Negative for color change and rash.   Allergic/Immunologic: Negative for environmental allergies and food allergies.   Neurological:  Negative for  dizziness, tremors, speech difficulty, weakness, numbness and headaches.   Hematological:  Does not bruise/bleed easily.   Psychiatric/Behavioral:  Negative for agitation, dysphoric mood and sleep disturbance. The patient is not nervous/anxious.         MEDICATIONS  Current Outpatient Medications   Medication Instructions    biotin 1 mg capsule 1 capsule, oral, Daily    cholecalciferol (Vitamin D-3) 125 MCG (5000 UT) capsule 1 capsule, oral, Daily    cyanocobalamin, vitamin B-12, (Vitamin B-12) 1,000 mcg tablet extended release 1 tablet, oral, Daily    iron, carbonyl 25 mg iron tablet 1 tablet, oral, Daily    mesalamine ER (APRISO) 1.5 g, oral, Daily    pyridoxine (Vitamin B-6) 50 mg tablet 1 tablet, oral, Daily        ALLERGIES  Allergies   Allergen Reactions    Ceftibuten Other     nausea    Cefuroxime Axetil Diarrhea and Nausea Only    Nsaids (Non-Steroidal Anti-Inflammatory Drug) Other     can cause bleeding, pt has UC        Past Medical History:   Diagnosis Date    Low back pain, unspecified 07/27/2022    Chronic left-sided low back pain without sciatica    Personal history of other diseases of the digestive system     History of proctitis    Personal history of other infectious and parasitic diseases     History of gonorrhea of rectum    Ulcerative colitis, unspecified, without complications (CMS/[facility]) 10/20/2022    Ulcerative colitis      Past Surgical History:   Procedure Laterality Date    OTHER SURGICAL HISTORY  02/05/2019    [place] tooth extraction    OTHER SURGICAL HISTORY  02/05/2019    Gallbladder surgery    OTHER SURGICAL HISTORY  06/22/2020    Colonoscopy    OTHER SURGICAL HISTORY  06/22/2020    Esophagogastroduodenoscopy       Cancer-related family history includes Breast cancer (age of onset: 25) in her mother's sister; Breast cancer (age of onset: 35) in her mother; Lung cancer (age of onset: 60 - 69) in her father's sister; Ovarian cancer (age of onset: 40 - 49) in an other family member;  Testicular cancer (age of onset: 50) in her maternal grandfather.       SOCIAL HISTORY      Social History     Tobacco Use    Smoking status: Never     Passive exposure: Never    Smokeless tobacco: Never   Substance Use Topics    Alcohol use: Not Currently        VITALS  Vitals:    10/09/23 1515   BP: 115/77   Pulse: 77        PHYSICAL EXAM  Patient is alert and oriented x3, with appropriate mood. The gait is steady and hand grasps are equal. Sclera clear. The breasts are nearly symmetrical. The tissue is soft without palpable abnormalities, discrete nodules or masses. The skin and nipples appear normal. There is no cervical, supraclavicular, or axillary lymphadenopathy palpable. Heart rate and rhythm normal, S1 and S2 appreciated. The lungs are clear bilaterally. Abdomen is soft & non-tender.      IMAGING  BI US breast limited left 10/09/2023    Narrative  Interpreted By:  Nona Song,  STUDY:  BI US BREAST LIMITED LEFT;  10/9/2023 10:56 am    ACCESSION NUMBER(S):  [id number]    ORDERING CLINICIAN:  MARYCHUY LAMB PLA    INDICATION:  Patient presents for a follow-up of a probably benign left breast mass    COMPARISON:  06/19/2023, 11/21/2020    FINDINGS:  Targeted ultrasound was performed. In the left breast at the 3  o'clock position 11 cm from the nipple there is an oval circumscribed  hypoechoic mass measuring 0.6 x 0.4 x 0.5 cm. It is soft with  elastography and there is no internal vascularity. This mass is  stable. Targeted ultrasound demonstrates no suspicious masses.    Impression  Probably benign left breast mass. Recommendation is for a follow-up  ultrasound at the time of the patient's annual bilateral mammogram.    BI-RADS CATEGORY:    BI-RADS Category:  3 Probably Benign.  Recommendation:  Short Interval Follow-up.  Recommended Date:  6 Months.  Laterality:  Left.    For any future breast imaging appointments, please call [phone]).      MACRO:  None    Signed by: Nona Song  10/9/2023 11:12 AM  Dictation workstation:   [id number]     Time was spent viewing digital images of the radiology testing with the patient. I explained the results in depth, along with suggested explanation for follow up recommendations based on the testing results.          ORDERS  Orders Placed This Encounter   Procedures    BI mammo bilateral diagnostic tomosynthesis     Standing Status:   Future     Standing Expiration Date:   12/9/2024     Order Specific Question:   Perform a breast ultrasound if clinically indicated by Radiologist?     Answer:   Yes     Order Specific Question:   Previous Mamm performed at  location?     Answer:   Yes     Order Specific Question:   Reason for exam:     Answer:   left breast left cluster     Order Specific Question:   Radiologist to Determine Optimal Study     Answer:   Yes     Order Specific Question:   Release result to Placester     Answer:   Immediate [1]     Order Specific Question:   Is this exam part of a Research Study? If Yes, link this order to the research study     Answer:   No     Order Specific Question:   Is the patient pregnant?     Answer:   No    BI US breast limited left     Standing Status:   Future     Standing Expiration Date:   12/9/2024     Order Specific Question:   Reason for exam:     Answer:   left breast cyst     Order Specific Question:   Radiologist to Determine Optimal Study     Answer:   Yes     Order Specific Question:   Release result to [facility]ter     Answer:   Immediate     Order Specific Question:   Is this exam part of a Research Study? If Yes, link this order to the research study     Answer:   No    Referral to Genetics     Standing Status:   Future     Standing Expiration Date:   4/9/2024     Referral Priority:   Routine     Referral Type:   Consultation     Referral Reason:   Specialty Services Required     Requested Specialty:   Genetics     Number of Visits Requested:   1          ASSESSMENT/PLAN    1. Breast cyst, left  BI mammo  bilateral diagnostic tomosynthesis    BI US breast limited left      2. Abnormal mammogram of left breast  BI mammo bilateral diagnostic tomosynthesis      3. Family history of breast cancer  Referral to Genetics           Return June 2024 for bilateral diagnostic mammogram and office visit. Referral placed for genetic testing      Kimberly August, [facility]

## 2023-10-10 ENCOUNTER — APPOINTMENT (OUTPATIENT)
Dept: SURGICAL ONCOLOGY | Facility: CLINIC | Age: 38
End: 2023-10-10
Payer: COMMERCIAL

## 2023-12-04 ENCOUNTER — HOSPITAL ENCOUNTER (EMERGENCY)
Facility: HOSPITAL | Age: 38
Discharge: HOME | End: 2023-12-04
Attending: FAMILY MEDICINE
Payer: COMMERCIAL

## 2023-12-04 VITALS
BODY MASS INDEX: 50.2 KG/M2 | RESPIRATION RATE: 16 BRPM | WEIGHT: 265.88 LBS | DIASTOLIC BLOOD PRESSURE: 76 MMHG | OXYGEN SATURATION: 98 % | HEART RATE: 76 BPM | HEIGHT: 61 IN | TEMPERATURE: 97.9 F | SYSTOLIC BLOOD PRESSURE: 130 MMHG

## 2023-12-04 DIAGNOSIS — R73.9 HYPERGLYCEMIA: ICD-10-CM

## 2023-12-04 DIAGNOSIS — F32.89 OTHER DEPRESSION: ICD-10-CM

## 2023-12-04 DIAGNOSIS — R45.851 SUICIDAL THOUGHTS: Primary | ICD-10-CM

## 2023-12-04 DIAGNOSIS — D72.820 LYMPHOCYTOSIS: ICD-10-CM

## 2023-12-04 LAB
AMPHETAMINES UR QL SCN>1000 NG/ML: NEGATIVE
ANION GAP SERPL CALC-SCNC: 13 MMOL/L (ref 10–20)
APPEARANCE UR: CLEAR
BARBITURATES UR QL SCN>300 NG/ML: NEGATIVE
BASOPHILS # BLD AUTO: 0.03 X10*3/UL (ref 0–0.1)
BASOPHILS NFR BLD AUTO: 0.2 %
BENZODIAZ UR QL SCN>300 NG/ML: NEGATIVE
BILIRUB UR STRIP.AUTO-MCNC: NEGATIVE MG/DL
BUN SERPL-MCNC: 9 MG/DL (ref 6–23)
BZE UR QL SCN>300 NG/ML: NEGATIVE
CALCIUM SERPL-MCNC: 9.5 MG/DL (ref 8.6–10.3)
CANNABINOIDS UR QL SCN>50 NG/ML: NEGATIVE
CHLORIDE SERPL-SCNC: 104 MMOL/L (ref 98–107)
CO2 SERPL-SCNC: 25 MMOL/L (ref 21–32)
COLOR UR: YELLOW
CREAT SERPL-MCNC: 0.52 MG/DL (ref 0.5–1.05)
EOSINOPHIL # BLD AUTO: 0.04 X10*3/UL (ref 0–0.7)
EOSINOPHIL NFR BLD AUTO: 0.3 %
ERYTHROCYTE [DISTWIDTH] IN BLOOD BY AUTOMATED COUNT: 14.1 % (ref 11.5–14.5)
FENTANYL+NORFENTANYL UR QL SCN: NEGATIVE
GFR SERPL CREATININE-BSD FRML MDRD: >90 ML/MIN/1.73M*2
GLUCOSE SERPL-MCNC: 119 MG/DL (ref 74–99)
GLUCOSE UR STRIP.AUTO-MCNC: NEGATIVE MG/DL
HCG UR QL IA.RAPID: NEGATIVE
HCT VFR BLD AUTO: 36.4 % (ref 36–46)
HGB BLD-MCNC: 11.7 G/DL (ref 12–16)
HOLD SPECIMEN: NORMAL
IMM GRANULOCYTES # BLD AUTO: 0.01 X10*3/UL (ref 0–0.7)
IMM GRANULOCYTES NFR BLD AUTO: 0.1 % (ref 0–0.9)
KETONES UR STRIP.AUTO-MCNC: NEGATIVE MG/DL
LEUKOCYTE ESTERASE UR QL STRIP.AUTO: NEGATIVE
LYMPHOCYTES # BLD AUTO: 3.21 X10*3/UL (ref 1.2–4.8)
LYMPHOCYTES NFR BLD AUTO: 25.8 %
MCH RBC QN AUTO: 25.8 PG (ref 26–34)
MCHC RBC AUTO-ENTMCNC: 32.1 G/DL (ref 32–36)
MCV RBC AUTO: 80 FL (ref 80–100)
METHADONE UR QL SCN>300 NG/ML: NEGATIVE
MONOCYTES # BLD AUTO: 0.69 X10*3/UL (ref 0.1–1)
MONOCYTES NFR BLD AUTO: 5.6 %
NEUTROPHILS # BLD AUTO: 8.45 X10*3/UL (ref 1.2–7.7)
NEUTROPHILS NFR BLD AUTO: 68 %
NITRITE UR QL STRIP.AUTO: NEGATIVE
NRBC BLD-RTO: ABNORMAL /100{WBCS}
OPIATES UR QL SCN>300 NG/ML: NEGATIVE
OXYCODONE UR QL: NEGATIVE
PCP UR QL SCN>25 NG/ML: NEGATIVE
PH UR STRIP.AUTO: 6 [PH]
PLATELET # BLD AUTO: 325 X10*3/UL (ref 150–450)
POTASSIUM SERPL-SCNC: 3.8 MMOL/L (ref 3.5–5.3)
PROT UR STRIP.AUTO-MCNC: NEGATIVE MG/DL
RBC # BLD AUTO: 4.53 X10*6/UL (ref 4–5.2)
RBC # UR STRIP.AUTO: NEGATIVE /UL
SODIUM SERPL-SCNC: 138 MMOL/L (ref 136–145)
SP GR UR STRIP.AUTO: 1.02
UROBILINOGEN UR STRIP.AUTO-MCNC: 0.2 MG/DL
WBC # BLD AUTO: 12.4 X10*3/UL (ref 4.4–11.3)

## 2023-12-04 PROCEDURE — 81025 URINE PREGNANCY TEST: CPT | Performed by: FAMILY MEDICINE

## 2023-12-04 PROCEDURE — 36415 COLL VENOUS BLD VENIPUNCTURE: CPT | Performed by: FAMILY MEDICINE

## 2023-12-04 PROCEDURE — 81003 URINALYSIS AUTO W/O SCOPE: CPT | Mod: 59 | Performed by: FAMILY MEDICINE

## 2023-12-04 PROCEDURE — 99284 EMERGENCY DEPT VISIT MOD MDM: CPT | Mod: 25 | Performed by: FAMILY MEDICINE

## 2023-12-04 PROCEDURE — 80307 DRUG TEST PRSMV CHEM ANLYZR: CPT | Performed by: FAMILY MEDICINE

## 2023-12-04 PROCEDURE — 80048 BASIC METABOLIC PNL TOTAL CA: CPT | Performed by: FAMILY MEDICINE

## 2023-12-04 PROCEDURE — 99285 EMERGENCY DEPT VISIT HI MDM: CPT | Performed by: FAMILY MEDICINE

## 2023-12-04 PROCEDURE — 85025 COMPLETE CBC W/AUTO DIFF WBC: CPT | Performed by: FAMILY MEDICINE

## 2023-12-04 SDOH — HEALTH STABILITY: MENTAL HEALTH: DEPRESSION SYMPTOMS: FEELINGS OF HELPLESSNESS

## 2023-12-04 SDOH — HEALTH STABILITY: MENTAL HEALTH: WISH TO BE DEAD (PAST 1 MONTH): NO

## 2023-12-04 SDOH — ECONOMIC STABILITY: HOUSING INSECURITY: FEELS SAFE LIVING IN HOME: YES

## 2023-12-04 SDOH — HEALTH STABILITY: MENTAL HEALTH: ACTIVE SUICIDAL IDEATION WITH SOME INTENT TO ACT, WITHOUT SPECIFIC PLAN (PAST 1 MONTH): NO

## 2023-12-04 SDOH — HEALTH STABILITY: MENTAL HEALTH: SUICIDAL BEHAVIOR (LIFETIME): NO

## 2023-12-04 SDOH — HEALTH STABILITY: MENTAL HEALTH: NON-SPECIFIC ACTIVE SUICIDAL THOUGHTS (PAST 1 MONTH): YES

## 2023-12-04 SDOH — HEALTH STABILITY: MENTAL HEALTH: ACTIVE SUICIDAL IDEATION WITH SPECIFIC PLAN AND INTENT (PAST 1 MONTH): NO

## 2023-12-04 SDOH — HEALTH STABILITY: MENTAL HEALTH: ANXIETY SYMPTOMS: GENERALIZED

## 2023-12-04 ASSESSMENT — LIFESTYLE VARIABLES
PRESCIPTION_ABUSE_PAST_12_MONTHS: NO
SUBSTANCE_ABUSE_PAST_12_MONTHS: NO

## 2023-12-04 ASSESSMENT — COLUMBIA-SUICIDE SEVERITY RATING SCALE - C-SSRS
6. HAVE YOU EVER DONE ANYTHING, STARTED TO DO ANYTHING, OR PREPARED TO DO ANYTHING TO END YOUR LIFE?: NO
4. HAVE YOU HAD THESE THOUGHTS AND HAD SOME INTENTION OF ACTING ON THEM?: NO
2. HAVE YOU ACTUALLY HAD ANY THOUGHTS OF KILLING YOURSELF?: YES
1. IN THE PAST MONTH, HAVE YOU WISHED YOU WERE DEAD OR WISHED YOU COULD GO TO SLEEP AND NOT WAKE UP?: NO
5. HAVE YOU STARTED TO WORK OUT OR WORKED OUT THE DETAILS OF HOW TO KILL YOURSELF? DO YOU INTEND TO CARRY OUT THIS PLAN?: NO

## 2023-12-04 ASSESSMENT — PAIN SCALES - GENERAL: PAINLEVEL_OUTOF10: 0 - NO PAIN

## 2023-12-04 ASSESSMENT — PAIN - FUNCTIONAL ASSESSMENT: PAIN_FUNCTIONAL_ASSESSMENT: 0-10
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
## 2023-12-04 NOTE — PROGRESS NOTES
EPAT - Social Work Psychiatric Assessment    Arrival Details  Mode of Arrival: Ambulatory  Admission Source: Home  Admission Type: Voluntary  EPAT Assessment Start Date: 12/04/23  EPAT Assessment Start Time: 1636  Name of : ELAN Brice LSW    History of Present Illness  Admission Reason: Suicidal Ideation  HPI: Patient is a 39yo female bringing herself into the ED with chief complaint of suicidal ideation. Patient reportedly “states she started having suicidal thoughts earlier today. She denies having a plan and states she currently feels safe at home. No HI”. Patient’s chart, triage, and provider note reviewed prior to assessment. Patient has a psychiatric history of Depression and is currently working with an outpatient counselor through [employer], whom she sees weekly. The patient denies history of self-harm or suicide attempts, C-SSRS not collected at triage. She denies previous inpatient admissions and is not prescribed medication for her mental health. BAL and UTOX not available, patient denies current alcohol or substance use.    SW Readmission Information   Readmission within 30 Days: No    Psychiatric Symptoms  Anxiety Symptoms: Generalized  Depression Symptoms: Feelings of helplessness  Michelle Symptoms: No problems reported or observed.    Psychosis Symptoms  Hallucination Type: No problems reported or observed.  Delusion Type: No problems reported or observed.    Additional Symptoms - Adult  Generalized Anxiety Disorder: Excessive anxiety/worry  Obsessive Compulsive Disorder: No problems reported or observed.  Panic Attack: No problems reported or observed.  Post Traumatic Stress Disorder: No problems reported or observed.  Delirium: No problems reported or observed.    Past Psychiatric History/Meds/Treatments  Past Psychiatric History: Psychiatric Diagnosis: Anxiety, Depression // Current  Center: [facility] // Previous Admissions: Denies // History of self-harm/SA:  Denies  Past Psychiatric Meds/Treatments: Denies  Past Violence/Victimization History: Denies    Current Mental Health Contacts   Name/Phone Number: none   Last Appointment Date: none  Provider Name/Phone Number: Charu Black / [facility]  Provider Last Appointment Date: Weekly, upcoming tomorrow 12/5/23    Support System: Immediate family, Extended family, Friends, Community    Living Arrangement: House, Lives with someone    Home Safety  Feels Safe Living in Home: Yes    Income Information  Employment Status for: Patient  Employment Status: Unemployed  Income Source: Unemployed  Current/Previous Occupation: Education    [employer] Service/Education History  Current or Previous  Service: None  Education Level: Graduate school  History of Learning Problems: No  History of School Behavior Problems: No    Social/Cultural History  Social History: [place] Citizen: Yes // Payee: None // Guardian/POA: self  Cultural Requests During Hospitalization: none  Spiritual Requests During Hospitalization: none  Important Activities: Family, Social    Legal  Criminal Activity/ Legal Involvement Pertinent to Current Situation/ Hospitalization: Denies  Legal Concerns: Denies    Drug Screening  Have you used any substances (canabis, cocaine, heroin, hallucinogens, inhalants, etc.) in the past 12 months?: No  Have you used any prescription drugs other than prescribed in the past 12 months?: No  Is a toxicology screen needed?: Yes    Stage of Change  Stage of Change:  (n/a)  History of Treatment:  (n/a)  Type of Treatment Offered:  (n/a)  Treatment Offered:  (n/a)  Duration of Substance Use: n/a  Frequency of Substance Use: n/a  Age of First Substance Use: n/a    Psychosocial  Psychosocial (WDL): Within Defined Limits    Orientation  Orientation Level: Oriented X4    General Appearance  Motor Activity: Unremarkable  Speech Pattern:  (Unremarkable)  General Attitude: Cooperative,  Pleasant  Appearance/Hygiene: Unremarkable    Thought Process  Coherency:  (Unremarkable)  Content: Unremarkable  Delusions:  (None)  Perception: Not altered  Hallucination: None  Judgment/Insight:  (Good)  Confusion: None  Cognition: Appropriate judgement, Appropriate attention/concentration, Appropriate safety awareness    Sleep Pattern  Sleep Pattern: Disturbed/interrupted sleep    Risk Factors  Self Harm/Suicidal Ideation Plan: Denies  Previous Self Harm/Suicidal Plans: Denies  Risk Factors: Unemployment    Violence Risk Assessment  Assessment of Violence: None noted  Thoughts of Harm to Others: No    Ability to Assess Risk Screen  Risk Screen - Ability to Assess: Able to be screened  [place] Suicide Severity Rating Scale (Screener/Recent Self-Report)  1. Wish to be Dead (Past 1 Month): No  2. Non-Specific Active Suicidal Thoughts (Past 1 Month): Yes  3. Active Suicidal Ideation with any Methods (Not Plan) Without Intent to Act (Past 1 Month): No  4. Active Suicidal Ideation with Some Intent to Act, Without Specific Plan (Past 1 Month): No  5. Active Suicidal Ideation with Specific Plan and Intent (Past 1 Month): No  6. Suicidal Behavior (Lifetime): No  Calculated C-SSRS Risk Score (Lifetime/Recent): Low Risk  Step 1: Risk Factors  Current & Past Psychiatric Dx: Mood disorder  Precipitants/Stressors: Triggering events leading to humiliation, shame, and/or despair (e.g. loss of relationship, financial or health status) (real or anticipated)  Access to Lethal Methods : No  Step 2: Protective Factors   Protective Factors Internal: Ability to cope with stress, Frustration tolerance, Fear of death or the actual act of killing self, Identifies reasons for living  Protective Factors External: Cultural, spiritual and/or moral attitudes against suicide, Supportive social network or family or friends, Positive therapeutic relationships  Step 3: Suicidal Ideation Intensity  Most Severe Suicidal Ideation Identified:  "Passive SI with \"fleeting thoughts\"  How Many Times Have You Had These Thoughts: Less than once a week  When You Have the Thoughts How Long do They Last : Fleeting - few seconds or minutes  Could/Can You Stop Thinking About Killing Yourself or Wanting to Die if You Want to: Easily able to control thoughts  Are There Things - Anyone or Anything - That Stopped You From Wanting to Die or Acting on: Deterrents definitely stopped you from attempting suicide  What Sort of Reasons Did You Have For Thinking About Wanting to Die or Killing Yourself: Equally to get attention, revenge or a reaction from others and to end/stop the pain  Total Score: 7  Step 5: Documentation  Risk Level: Low suicide risk (Patient low risk, denies active SI/plan/intent or lifetime hx of self-harm. Discussed with Dr. Stubbs)    Psychiatric Impression and Plan of Care  Assessment and Plan:     Upon assessment the patient remained calm, cooperative, and pleasant. She denied current SI/HI/AVH and no delusions were elicited. 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 The patient reports she had gone to the park earlier today “to center myself, it's usually where I go to help calm down”, but had a “fleeting thought to drive my car a little further”. She states she “immediately thought 'oh no, these are not the kind of thoughts I'm supposed to have', it startled me, I had no plan in place and I was not committed to these thoughts but the fact it popped up I came in to make sure I'm safe, I know enough not to play with those thoughts”. The patient reports that “after two hours of sitting here with the staff I just needed this moment to recognize my need to use my resources better”, indicating her plan to inform her family of the events of today to ensure her support system is " "aware of her needs. Patient endorsed difficulty sleeping because she recently moved in with family and has been in a transitional period. She otherwise denied disturbances in appetite or ADLs, stating she “actually increased going to the gym to help work through the emotional time right now”. The patient explained she had recently become unemployed, which she listed as her main stressor. 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 She identified significant support in her brother, SADIE, friends, and father. Patient reports many deterrents to self-harm, including acknowledging “this is just a rough patch” and “my family would be devastated”. The patient endorsed feeling safe at home and denied access to firearms. She reports an appointment with her counselor tomorrow at 3pm and states she plans to stay with family/friends in the area as she currently lives 1.5 hours away and “don't want to risk any more stress with traffic or driving back and forth”. She states she has “plenty of friends and family to stay with” until her counseling appointment. The patient denied interest in any further resources at this time. She remained future/goal-oriented and demonstrated good insight/judgement throughout. No symptoms of acute depression, psychosis, or elvin were elicited.     Diagnostic Impression: Unspecified Depressive D/O, Anxiety     Psychiatric Impression and Plan for Care: Patient does not present as an acute risk to self or others, nor appears gravely disabled by presenting symptoms. She remained low risk on C-SSRS throughout. Recommendation for d/c with follow-up outpatient discussed with Dr. Stubbs who is in agreement.     Specific Resources Provided to Patient: d/c    Outcome/Disposition  Patient's Perception of Outcome Achieved: \"Yes, thank you\"  Assessment, Recommendations and Risk Level Reviewed with: Dr. Stubbs  Contact Name: Ely " Trent  Contact Number(s): [phone]  Contact Relationship: Other  EPAT Assessment Completed Date: 12/04/23  EPAT Assessment Completed Time: 1748  Patient Disposition: Home
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
## 2023-12-05 ENCOUNTER — HOSPITAL ENCOUNTER (OUTPATIENT)
Dept: CARDIOLOGY | Facility: HOSPITAL | Age: 38
Discharge: HOME | End: 2023-12-05
Payer: COMMERCIAL

## 2023-12-05 LAB
ATRIAL RATE: 68 BPM
P AXIS: 24 DEGREES
P OFFSET: 190 MS
P ONSET: 135 MS
PR INTERVAL: 174 MS
Q ONSET: 222 MS
QRS COUNT: 11 BEATS
QRS DURATION: 76 MS
QT INTERVAL: 382 MS
QTC CALCULATION(BAZETT): 406 MS
QTC FREDERICIA: 398 MS
R AXIS: 48 DEGREES
T AXIS: 14 DEGREES
T OFFSET: 413 MS
VENTRICULAR RATE: 68 BPM

## 2023-12-05 PROCEDURE — 93005 ELECTROCARDIOGRAM TRACING: CPT

## 2023-12-19 ENCOUNTER — APPOINTMENT (OUTPATIENT)
Dept: SURGICAL ONCOLOGY | Facility: CLINIC | Age: 38
End: 2023-12-19
Payer: COMMERCIAL

## 2024-02-12 DIAGNOSIS — J30.9 ALLERGIC RHINITIS, UNSPECIFIED SEASONALITY, UNSPECIFIED TRIGGER: Primary | ICD-10-CM

## 2024-02-13 RX ORDER — FLUTICASONE PROPIONATE 50 MCG
SPRAY, SUSPENSION (ML) NASAL DAILY
Qty: 16 ML | Refills: 2 | Status: SHIPPED | OUTPATIENT
Start: 2024-02-13

## 2024-03-01 ENCOUNTER — OFFICE VISIT (OUTPATIENT)
Dept: NEUROLOGY | Facility: CLINIC | Age: 39
End: 2024-03-01
Payer: COMMERCIAL

## 2024-03-01 VITALS
BODY MASS INDEX: 50.24 KG/M2 | HEIGHT: 61 IN | HEART RATE: 76 BPM | SYSTOLIC BLOOD PRESSURE: 105 MMHG | DIASTOLIC BLOOD PRESSURE: 67 MMHG

## 2024-03-01 DIAGNOSIS — G93.5 CHIARI I MALFORMATION (MULTI): Primary | ICD-10-CM

## 2024-03-01 DIAGNOSIS — G93.89 CEREBRAL CALCIFICATION: ICD-10-CM

## 2024-03-01 DIAGNOSIS — R41.840 IMPAIRED CONCENTRATION: ICD-10-CM

## 2024-03-01 PROCEDURE — 1036F TOBACCO NON-USER: CPT | Performed by: PSYCHIATRY & NEUROLOGY

## 2024-03-01 PROCEDURE — 99214 OFFICE O/P EST MOD 30 MIN: CPT | Performed by: PSYCHIATRY & NEUROLOGY

## 2024-03-01 NOTE — PROGRESS NOTES
Armida Smith is a 39 y.o. year old female seen in follow-up for 5 mm Chiari I malformation, left lower extremity paresthesia, cognitive concerns.    HPI    39-year-old right-handed -American woman with past medical history significant for chronic low back pain associated with left sciatica, IBS, anxiety and depression, pancreatic insufficiency, obesity, cholecystectomy, TMJ.     I evaluated her initially and most recently on 9/30/2022.  She presented primarily because of concern over radiologic findings. She had seen an ENT about 2 years previous because of sinus issues and underwent a sinus CT which incidentally noted cerebral calcifications. This was confirmed on subsequent dedicated head CT.      I reviewed a noncontrast head CT from 12/16/2020 showing symmetric calcification in the basal ganglia and in the retrosplenial regions as well as a small focus of calcification of the anterior interhemispheric falx and small anterior calvarial calcifications.     She was unaware as to any history of congenital (TORCH) infection or whether any family members had been found to have cerebral calcifications. She denies history of seizures.     She was subsequently sent for a brain MRI, done outside the  system in [facility] on 3/3/2022 and I reviewed the images and report. The MRI showed asymmetry of the lateral ventricles, right larger than left, without midline shift. Pristine cerebral white matter on T2 and FLAIR sequences. Tonsillar ectopia which I measured at roughly 4-5 mm but measured by radiology at 5.7 mm. No brainstem compression or stigmata of hydrocephalus. Calcifications were more evident on the CT than on the MRI.     In terms of neurologic symptoms she indicated a long history of predominantly left-sided low back pain and associated left sciatica, for years. Also over the past year episodically she had noted paresthesias that feel like cold water trickling over the left popliteal  fossa and adjacent distal posterior thigh and proximal calf regions.      I reviewed lumbar spine x-rays from 7/28/2022, showing preserved sagittal alignment, preserved disc space heights, mild facet degenerative changes at lower lumbar levels.    I reviewed a likely idiopathic basis of cerebral calcifications in her case, and that they appeared to be asymptomatic, but I did recommend checking serum parathyroid hormone.  I reviewed borderline criteria for Chiari I malformation and the fact that this contraindicated lumbar puncture.  I recommended seeing neuro-ophthalmology regarding a complaint of OD visual disturbance.  I recommended checking vitamin B12 level with regard to lower extremity paresthesias.    Vitamin B12 level and parathyroid hormone came back normal.    She is evaluated today in the office, returning after a long interval.    Her previous complaint of left lower extremity paresthesia has improved considerably.  She does water aerobics at least 2 days a week and has intentionally lost some weight and she feels that these measures have helped.  She has not noted recent sciatica.    She denies numbness or tingling in the hands.  Occasionally she notes a cramp of the left index finger.      She still notes floaters occasionally but no progressive worsening of vision.  She indicates seeing optometry with a benign exam apparently including dilated funduscopy although the report is not available for direct review today.    She denies headaches or neck pain.    She denies recent vertigo.  However, she had a prolonged case of mal de debarquement after being on a small sailboat as part of a sailing class for 2 consecutive days.  She indicates each session was only about 90 minutes he had afterwards she felt as if she was still bouncing up and down and felt nauseous and the symptoms persisted for about the next 6 weeks.    She feels absent-minded.  A few times a week she loses track of why she has walked into  her room and has to think about it briefly to remember what she was going to do.  She sometimes loses her train of thought in mid conversation although this is not evident today.  She drives without incident.    She reports reasonable quality of sleep but it is not typically restorative.  She feels tired during the daytime.  She has not undergone a formal polysomnogram.       Review of Systems    As per the history of present illness    Patient Active Problem List   Diagnosis    Diarrhea    Abnormal CT scan, sinus    Anxiety and depression    Arthropathy of left temporomandibular joint    Atypical chest pain    Bilateral edema of lower extremity    Brain tumor (CMS/HCC)    Calcification of brain    Chiari I malformation (CMS/HCC)    Chronic anemia    Ulcerative colitis (CMS/HCC)    Erythema nodosum    Exocrine pancreatic insufficiency    Hematochezia    Hidradenitis suppurativa    Hyperglycemia    Hyperlipidemia, mild    Leukocytosis    Pseudotumor cerebri    Raised intracranial pressure    Vitamin D deficiency     Past Medical History:   Diagnosis Date    Low back pain, unspecified 07/27/2022    Chronic left-sided low back pain without sciatica    Personal history of other diseases of the digestive system     History of proctitis    Personal history of other infectious and parasitic diseases     History of gonorrhea of rectum    Ulcerative colitis, unspecified, without complications (CMS/HCC) 10/20/2022    Ulcerative colitis     Past Surgical History:   Procedure Laterality Date    OTHER SURGICAL HISTORY  02/05/2019    [place] tooth extraction    OTHER SURGICAL HISTORY  02/05/2019    Gallbladder surgery    OTHER SURGICAL HISTORY  06/22/2020    Colonoscopy    OTHER SURGICAL HISTORY  06/22/2020    Esophagogastroduodenoscopy     Social History     Tobacco Use    Smoking status: Never     Passive exposure: Never    Smokeless tobacco: Never   Substance Use Topics    Alcohol use: Not Currently     family history includes  Breast cancer (age of onset: 25) in her mother's sister; Breast cancer (age of onset: 35) in her mother; Lung cancer (age of onset: 60 - 69) in her father's sister; No Known Problems in her father; Ovarian cancer (age of onset: 40 - 49) in an other family member; Testicular cancer (age of onset: 50) in her maternal grandfather.    Current Outpatient Medications:     biotin 1 mg capsule, Take 1 capsule (1 mg) by mouth once daily., Disp: , Rfl:     cholecalciferol (Vitamin D-3) 125 MCG (5000 UT) capsule, Take 1 capsule (125 mcg) by mouth once daily., Disp: , Rfl:     cyanocobalamin, vitamin B-12, (Vitamin B-12) 1,000 mcg tablet extended release, Take 1 tablet (1,000 mcg) by mouth once daily., Disp: , Rfl:     fluticasone (Flonase) 50 mcg/actuation nasal spray, USE 1 TO 2 SPRAYS INTO EACH NOSTRIL ONCE DAILY, Disp: 16 mL, Rfl: 2    iron, carbonyl 25 mg iron tablet, Take 1 tablet by mouth once daily., Disp: , Rfl:     mesalamine ER (Apriso) 0.375 gram 24 hr capsule, Take 4 capsules (1.5 g) by mouth once daily., Disp: , Rfl:     pyridoxine (Vitamin B-6) 50 mg tablet, Take 1 tablet (50 mg) by mouth once daily., Disp: , Rfl:   Allergies   Allergen Reactions    Ceftibuten Other     nausea    Cefuroxime Axetil Diarrhea and Nausea Only    Nsaids (Non-Steroidal Anti-Inflammatory Drug) Other     can cause bleeding, pt has UC       Objective   Neurological Exam  Physical Exam    Physical Examination:    General: Alert woman who was ambulatory without assistive devices.  Masked due to pandemic.    Mental Status: Clear sensorium without fluctuation.  Appropriate in conversation.  Oriented to self, date and day of the week, floor.  Approximate as to the [place] street.  She registered 3/3 words and recalled 3/3 after distraction rapidly and without cueing.  She was unable to do serial 7 subtractions but scored 5/5 spelling world backward.  On fund of knowledge questioning she was able to name the president and   without hesitation.  Language was intact and fluent without paraphasic errors or hesitancy.    Cranial Nerves: Exam was conducted with patient masked due to pandemic.  Funduscopic exam revealed sharp temporal disc margins bilaterally.  Pupils were equal, round and reactive to light with no relative afferent pupillary defect.  Extraocular movements were intact and conjugate without nystagmus.  No ptosis.  Visual fields were full to confrontation tested binocularly.  Facial sensation was symmetric to pin over the forehead and lateral to the mask.  Forehead and periocular facial motor function was symmetrically intact, but perioral facial motor function was not assessed due to mask.  Hearing was grossly intact.  No dysarthria.  Shoulder shrug was symmetric.  Tongue protrusion was not assessed due to mask.    Motor: There was no pronator drift or asymmetry of finger taps.     Coordination: There was no postural or rest tremor, myoclonus or dystonic posturing.    Sensation: Romberg sign was absent.     Station: Intact and stable.    Gait: Stable and unremarkable.  Intact tandem.      Assessment/Plan     Her previous complaint of left lower extremity paresthesia has nearly resolved.    She has a very mild Chiari I malformation but no interval headaches.  She has not been noting vertigo.  We did discuss her prolonged episode of mal de debarquement and I discussed with her that she should try to avoid the same kind of stimulus as it is likely to provoke a similar episode in the future.  However, should she develop another episode I advised her to contact the office about a course of vestibular therapy.    She voices a complaint of absentmindedness and on questioning has nonrestorative sleep and some daytime sleepiness.  I wonder about obstructive sleep apnea and advised her to talk with her PCP about this possibility at her upcoming appointment later this month.  I discussed that she may warrant a full polysomnogram for  further evaluation, as sleep apnea could impact her cognition.    I advised her to follow-up in the office in 8 months.

## 2024-03-01 NOTE — PATIENT INSTRUCTIONS
Your neurological exam is stable today.    We discussed that your concerns regarding absentmindedness and impaired concentration may relate to mental inefficiency from inadequate quality of sleep.  I wonder about sleep apnea and recommend talking with your primary doctor at your upcoming visit about a polysomnogram to evaluate for this.    We discussed your experience of mal de debarquement syndrome after briefly being on a small boat.  This kind of syndrome is unfortunately likely to recur if you are again exposed to being on a boat, but may be treatable with vestibular therapy should it recur.  Contact my office if you experience that again.    Please see me in 8 months.  
(3) no apparent problem

## 2024-03-19 ENCOUNTER — OFFICE VISIT (OUTPATIENT)
Dept: PRIMARY CARE | Facility: CLINIC | Age: 39
End: 2024-03-19
Payer: COMMERCIAL

## 2024-03-19 VITALS
SYSTOLIC BLOOD PRESSURE: 114 MMHG | OXYGEN SATURATION: 99 % | WEIGHT: 269 LBS | DIASTOLIC BLOOD PRESSURE: 74 MMHG | RESPIRATION RATE: 16 BRPM | BODY MASS INDEX: 50.83 KG/M2 | TEMPERATURE: 97.8 F | HEART RATE: 85 BPM

## 2024-03-19 DIAGNOSIS — G93.5 CHIARI I MALFORMATION (MULTI): ICD-10-CM

## 2024-03-19 DIAGNOSIS — D72.829 LEUKOCYTOSIS, UNSPECIFIED TYPE: ICD-10-CM

## 2024-03-19 DIAGNOSIS — K51.919 ULCERATIVE COLITIS WITH COMPLICATION, UNSPECIFIED LOCATION (MULTI): ICD-10-CM

## 2024-03-19 DIAGNOSIS — E78.5 HYPERLIPIDEMIA, MILD: ICD-10-CM

## 2024-03-19 DIAGNOSIS — D49.6 BRAIN TUMOR (MULTI): ICD-10-CM

## 2024-03-19 DIAGNOSIS — R73.9 HYPERGLYCEMIA: ICD-10-CM

## 2024-03-19 DIAGNOSIS — R53.83 OTHER FATIGUE: Primary | ICD-10-CM

## 2024-03-19 PROCEDURE — 1036F TOBACCO NON-USER: CPT | Performed by: FAMILY MEDICINE

## 2024-03-19 PROCEDURE — 99214 OFFICE O/P EST MOD 30 MIN: CPT | Performed by: FAMILY MEDICINE

## 2024-03-19 ASSESSMENT — ENCOUNTER SYMPTOMS: FATIGUE: 1

## 2024-03-19 NOTE — PROGRESS NOTES
Subjective   Patient ID: Radha Smith is a 39 y.o. female who presents for Fatigue (X 4-6 weeks).    Here with complaints of general malaise for the past few weeks.    Admits to severe exhaustion  Bed 9-9:30  up at 5:30  Toss and turn at night    Did see neurology and chair malformation is ok.     Has had sleep study at home inconclusive in 2019  Would like a retest  Denies snoring, not sure if waking gasping  Not feeling rested, daytime fatique and sleepiness.     Hx of elevated WBC and anemia  Has seen hematology and ID and nothing found    Takes daily iron 25 mg, vit D, 10,000 day, vit C 500, b12 2500,  biotin 5,000, [place] johns wart.    Working long hours, day and night fundraising              Fatigue  Associated symptoms include fatigue.        Review of Systems   Constitutional:  Positive for fatigue.       Objective   /74   Pulse 85   Temp 36.6 °C (97.8 °F)   Resp 16   Wt 122 kg (269 lb)   SpO2 99%   BMI 50.83 kg/m²     Physical Exam  Vitals and nursing note reviewed.   Constitutional:       Appearance: Normal appearance.   Cardiovascular:      Rate and Rhythm: Normal rate and regular rhythm.   Pulmonary:      Effort: Pulmonary effort is normal.      Breath sounds: Normal breath sounds.   Musculoskeletal:      Cervical back: Normal range of motion.   Neurological:      Mental Status: She is alert.   Psychiatric:         Mood and Affect: Mood normal.         Behavior: Behavior normal.         Thought Content: Thought content normal.         Judgment: Judgment normal.         Assessment/Plan   Problem List Items Addressed This Visit             ICD-10-CM    Brain tumor (CMS/HCC) D49.6     Stable  Reviewed neurology note         Chiari I malformation (CMS/HCC) G93.5     Stable  Reviewed neurology note.          Ulcerative colitis (CMS/HCC) K51.90     Stable  Continue care per GI         Hyperglycemia R73.9     Checking labs and treat accordingly  Reviewed diet, not good.            Relevant Orders     Comprehensive Metabolic Panel    Hemoglobin A1C    Hyperlipidemia, mild E78.5     Checking labs and treat accordingly         Relevant Orders    Lipid Panel    Leukocytosis D72.829     Checking labs and treat accordingly         Relevant Orders    CBC and Auto Differential    Other fatigue - Primary R53.83     Checking labs and sleep study and treat accordingly         Relevant Orders    In-Center Sleep Study (Non-Sleep Provider Only)    TSH with reflex to Free T4 if abnormal    Vitamin D 1,25 Dihydroxy (for eval of hypercalcemia)    Graciela-Barr virus VCA antibody panel

## 2024-03-21 ENCOUNTER — TELEPHONE (OUTPATIENT)
Dept: SLEEP MEDICINE | Facility: CLINIC | Age: 39
End: 2024-03-21
Payer: COMMERCIAL

## 2024-03-23 ENCOUNTER — LAB (OUTPATIENT)
Dept: LAB | Facility: LAB | Age: 39
End: 2024-03-23
Payer: COMMERCIAL

## 2024-03-23 DIAGNOSIS — R53.83 OTHER FATIGUE: ICD-10-CM

## 2024-03-23 DIAGNOSIS — R73.9 HYPERGLYCEMIA: ICD-10-CM

## 2024-03-23 DIAGNOSIS — E78.5 HYPERLIPIDEMIA, MILD: ICD-10-CM

## 2024-03-23 DIAGNOSIS — D72.829 LEUKOCYTOSIS, UNSPECIFIED TYPE: ICD-10-CM

## 2024-03-23 PROCEDURE — 36415 COLL VENOUS BLD VENIPUNCTURE: CPT

## 2024-03-23 PROCEDURE — 86665 EPSTEIN-BARR CAPSID VCA: CPT

## 2024-03-23 PROCEDURE — 84443 ASSAY THYROID STIM HORMONE: CPT

## 2024-03-23 PROCEDURE — 85025 COMPLETE CBC W/AUTO DIFF WBC: CPT

## 2024-03-23 PROCEDURE — 82652 VIT D 1 25-DIHYDROXY: CPT

## 2024-03-23 PROCEDURE — 80053 COMPREHEN METABOLIC PANEL: CPT

## 2024-03-23 PROCEDURE — 83036 HEMOGLOBIN GLYCOSYLATED A1C: CPT

## 2024-03-23 PROCEDURE — 86664 EPSTEIN-BARR NUCLEAR ANTIGEN: CPT

## 2024-03-23 PROCEDURE — 86663 EPSTEIN-BARR ANTIBODY: CPT

## 2024-03-23 PROCEDURE — 80061 LIPID PANEL: CPT

## 2024-03-24 LAB
ALBUMIN SERPL BCP-MCNC: 4.1 G/DL (ref 3.4–5)
ALP SERPL-CCNC: 78 U/L (ref 33–110)
ALT SERPL W P-5'-P-CCNC: 10 U/L (ref 7–45)
ANION GAP SERPL CALC-SCNC: 12 MMOL/L (ref 10–20)
AST SERPL W P-5'-P-CCNC: 16 U/L (ref 9–39)
BASOPHILS # BLD AUTO: 0.06 X10*3/UL (ref 0–0.1)
BASOPHILS NFR BLD AUTO: 0.5 %
BILIRUB SERPL-MCNC: 0.4 MG/DL (ref 0–1.2)
BUN SERPL-MCNC: 11 MG/DL (ref 6–23)
CALCIUM SERPL-MCNC: 9.2 MG/DL (ref 8.6–10.6)
CHLORIDE SERPL-SCNC: 103 MMOL/L (ref 98–107)
CHOLEST SERPL-MCNC: 147 MG/DL (ref 0–199)
CHOLESTEROL/HDL RATIO: 3.3
CO2 SERPL-SCNC: 27 MMOL/L (ref 21–32)
CREAT SERPL-MCNC: 0.53 MG/DL (ref 0.5–1.05)
EBV EA IGG SER QL: NEGATIVE
EBV NA AB SER QL: NEGATIVE
EBV VCA IGG SER IA-ACNC: NEGATIVE
EBV VCA IGM SER IA-ACNC: NEGATIVE
EGFRCR SERPLBLD CKD-EPI 2021: >90 ML/MIN/1.73M*2
EOSINOPHIL # BLD AUTO: 0.08 X10*3/UL (ref 0–0.7)
EOSINOPHIL NFR BLD AUTO: 0.6 %
ERYTHROCYTE [DISTWIDTH] IN BLOOD BY AUTOMATED COUNT: 13.7 % (ref 11.5–14.5)
EST. AVERAGE GLUCOSE BLD GHB EST-MCNC: 114 MG/DL
GLUCOSE SERPL-MCNC: 87 MG/DL (ref 74–99)
HBA1C MFR BLD: 5.6 %
HCT VFR BLD AUTO: 37.4 % (ref 36–46)
HDLC SERPL-MCNC: 44 MG/DL
HGB BLD-MCNC: 11.5 G/DL (ref 12–16)
IMM GRANULOCYTES # BLD AUTO: 0.04 X10*3/UL (ref 0–0.7)
IMM GRANULOCYTES NFR BLD AUTO: 0.3 % (ref 0–0.9)
LDLC SERPL CALC-MCNC: 94 MG/DL
LYMPHOCYTES # BLD AUTO: 3.09 X10*3/UL (ref 1.2–4.8)
LYMPHOCYTES NFR BLD AUTO: 24.6 %
MCH RBC QN AUTO: 26 PG (ref 26–34)
MCHC RBC AUTO-ENTMCNC: 30.7 G/DL (ref 32–36)
MCV RBC AUTO: 85 FL (ref 80–100)
MONOCYTES # BLD AUTO: 0.65 X10*3/UL (ref 0.1–1)
MONOCYTES NFR BLD AUTO: 5.2 %
NEUTROPHILS # BLD AUTO: 8.63 X10*3/UL (ref 1.2–7.7)
NEUTROPHILS NFR BLD AUTO: 68.8 %
NON HDL CHOLESTEROL: 103 MG/DL (ref 0–149)
NRBC BLD-RTO: 0 /100 WBCS (ref 0–0)
PLATELET # BLD AUTO: 342 X10*3/UL (ref 150–450)
POTASSIUM SERPL-SCNC: 4.2 MMOL/L (ref 3.5–5.3)
PROT SERPL-MCNC: 7.8 G/DL (ref 6.4–8.2)
RBC # BLD AUTO: 4.42 X10*6/UL (ref 4–5.2)
SODIUM SERPL-SCNC: 138 MMOL/L (ref 136–145)
TRIGL SERPL-MCNC: 44 MG/DL (ref 0–149)
TSH SERPL-ACNC: 0.64 MIU/L (ref 0.44–3.98)
VLDL: 9 MG/DL (ref 0–40)
WBC # BLD AUTO: 12.6 X10*3/UL (ref 4.4–11.3)

## 2024-03-25 LAB — 1,25(OH)2D3 SERPL-MCNC: 79.9 PG/ML (ref 19.9–79.3)

## 2024-04-16 ENCOUNTER — TELEPHONE (OUTPATIENT)
Dept: PRIMARY CARE | Facility: CLINIC | Age: 39
End: 2024-04-16
Payer: COMMERCIAL

## 2024-04-16 NOTE — TELEPHONE ENCOUNTER
Pt states she had an appointment scheduled but needs to reschedule for later in the year. Pt states she has phone number to do so.

## 2024-04-16 NOTE — TELEPHONE ENCOUNTER
----- Message from James Gale sent at 4/15/2024  4:10 PM EDT -----  Regarding: SLEEP  Our office have made over 3 unsuccessful attempts to reach the patient to schedule a sleep study. If you will like us to move forward with scheduling this patient, please have the patient reach out to [facility] at [phone]!  Thank you

## 2024-04-17 ENCOUNTER — APPOINTMENT (OUTPATIENT)
Dept: SLEEP MEDICINE | Facility: CLINIC | Age: 39
End: 2024-04-17
Payer: COMMERCIAL

## 2024-04-25 ENCOUNTER — OFFICE VISIT (OUTPATIENT)
Dept: PRIMARY CARE | Facility: CLINIC | Age: 39
End: 2024-04-25
Payer: COMMERCIAL

## 2024-04-25 VITALS
WEIGHT: 267 LBS | OXYGEN SATURATION: 98 % | RESPIRATION RATE: 18 BRPM | BODY MASS INDEX: 49.13 KG/M2 | SYSTOLIC BLOOD PRESSURE: 137 MMHG | HEART RATE: 63 BPM | TEMPERATURE: 97.6 F | DIASTOLIC BLOOD PRESSURE: 78 MMHG | HEIGHT: 62 IN

## 2024-04-25 DIAGNOSIS — L30.9 ECZEMA, UNSPECIFIED TYPE: ICD-10-CM

## 2024-04-25 DIAGNOSIS — R05.3 PERSISTENT COUGH: Primary | ICD-10-CM

## 2024-04-25 DIAGNOSIS — J30.2 SEASONAL ALLERGIES: ICD-10-CM

## 2024-04-25 DIAGNOSIS — J45.21 MILD INTERMITTENT REACTIVE AIRWAY DISEASE WITH ACUTE EXACERBATION (HHS-HCC): ICD-10-CM

## 2024-04-25 PROCEDURE — 1036F TOBACCO NON-USER: CPT | Performed by: FAMILY MEDICINE

## 2024-04-25 PROCEDURE — 99214 OFFICE O/P EST MOD 30 MIN: CPT | Performed by: FAMILY MEDICINE

## 2024-04-25 RX ORDER — MONTELUKAST SODIUM 10 MG/1
10 TABLET ORAL NIGHTLY
Qty: 30 TABLET | Refills: 1 | Status: SHIPPED | OUTPATIENT
Start: 2024-04-25 | End: 2024-05-21

## 2024-04-25 RX ORDER — BENZONATATE 100 MG/1
100 CAPSULE ORAL 3 TIMES DAILY PRN
COMMUNITY

## 2024-04-25 RX ORDER — ALBUTEROL SULFATE 90 UG/1
2 AEROSOL, METERED RESPIRATORY (INHALATION) EVERY 6 HOURS PRN
COMMUNITY

## 2024-04-25 RX ORDER — HYDROCORTISONE 25 MG/G
CREAM TOPICAL 2 TIMES DAILY PRN
Qty: 30 G | Refills: 0 | Status: SHIPPED | OUTPATIENT
Start: 2024-04-25 | End: 2024-05-16

## 2024-04-25 ASSESSMENT — PATIENT HEALTH QUESTIONNAIRE - PHQ9
1. LITTLE INTEREST OR PLEASURE IN DOING THINGS: NOT AT ALL
SUM OF ALL RESPONSES TO PHQ9 QUESTIONS 1 AND 2: 0
2. FEELING DOWN, DEPRESSED OR HOPELESS: NOT AT ALL

## 2024-04-25 ASSESSMENT — PAIN SCALES - GENERAL: PAINLEVEL: 0-NO PAIN

## 2024-04-25 NOTE — PROGRESS NOTES
Subjective   Patient ID: Radha Smith is a 39 y.o. female who presents for cough,sore throat and some congestion (Patient negative covid a few weeks ago).    HPI   The patient presents to the clinic with previous complaints of cough, sore throat (pharyngitis), and mild nasal congestion. She has past medical history of mild hyperlipidemia, hyperglycemia, vitamin D deficiency, ulcerative colitis, hematochezia, brain tumor, chronic anemia, anxiety, depression, hidradenitis suppurativa, and edema of BLE.    This is a patient of Dr. Adelina Kee Pla (PCP).    The patient states that she had been diagnosed with a sinus infection on 04/04/2024 from a local clinic. Her cold-like (URI) symptoms initially started around ~03/30/2024. She was taking OTC Benadryl, Flonase nasal spray, albuterol rescue inhaler (occasionally), and DayQuil when she first noticed her symptoms. After consultation with a healthcare provider at the clinic, she states that she was started on benzonatate 100 mg (3 times daily PRN) and methylprednisolone for treatment of this condition and took course of Doxycycline. She states that her symptoms have decreased, but she continues to experience a lingering productive cough (secondary to post-nasal drip). She denies any wheezing symptoms. She may experience acid reflux depending on her diet. She noticed one episode/occasion of acid reflux on 04/22/2024. She also notes inflammation/itchiness on her face (started this week) and raspy voice. Currently, in terms of OTC medication, she is only taking OTC Benadryl at this time. She states that she had taken a COVID-19 test several weeks ago and she was negative for COVID-19 at the time.    She had asthma and allergies as child and she states never wheezed but would cough and responded to rescue mdi which helps her symptoms now also    Her blood pressure (137/78) was slightly elevated when checked in the clinic today. She states that her blood pressure is  "usually ~120/70.    The patient reports inflammation/itchiness on her right leg (right ankle region). It appears she has some mild eczema    Review of Systems    Objective   /78   Pulse 63   Temp 36.4 °C (97.6 °F)   Resp 18   Ht 1.562 m (5' 1.5\")   Wt 121 kg (267 lb)   SpO2 98%   BMI 49.63 kg/m²     Physical Exam  Constitutional:       General: She is not in acute distress.     Appearance: She is not ill-appearing or toxic-appearing.   HENT:      Head: Normocephalic.      Right Ear: Tympanic membrane normal.      Left Ear: Tympanic membrane normal.      Nose: Nose normal.      Mouth/Throat:      Comments: Pnd, no erythema  Cardiovascular:      Rate and Rhythm: Normal rate and regular rhythm.      Pulses: Normal pulses.      Heart sounds: Normal heart sounds.   Pulmonary:      Effort: Pulmonary effort is normal.      Breath sounds: Normal breath sounds.   Abdominal:      General: Bowel sounds are normal.      Palpations: Abdomen is soft. There is no mass.      Tenderness: There is no abdominal tenderness.   Musculoskeletal:         General: Normal range of motion.      Cervical back: Normal range of motion.      Right lower leg: No edema.      Left lower leg: No edema.   Lymphadenopathy:      Cervical: No cervical adenopathy.   Skin:     General: Skin is warm and dry.   Neurological:      Mental Status: She is alert and oriented to person, place, and time.   Psychiatric:         Mood and Affect: Mood normal.         Thought Content: Thought content normal.         Judgment: Judgment normal.         Assessment/Plan   Problem List Items Addressed This Visit    None  Visit Diagnoses         Codes    Persistent cough    -  Primary R05.3    Mild intermittent reactive airway disease with acute exacerbation ([facility])     J45.21    Relevant Medications    montelukast (Singulair) 10 mg tablet    Eczema, unspecified type     L30.9    Relevant Medications    hydrocortisone 2.5 % cream    Seasonal allergies     " "J30.2               In regards to concerns with lingering cough, upon brief physical examination, the patient was advised that this condition is likely due to exacerbation of allergy/asthma symptoms rather than a sinus infection which was treated with antibiotic previously. Consequently, she received a prescription for montelukast 10 mg medication and she was instructed to take 1 tablet (10 mg) of montelukast medication by mouth once daily in the evening. She was also advised to try Flonase Sensimist nasal spray (2 puffs per nostril once daily in the morning) for additional treatment of this condition. Continue monitoring for potential side-effects to medication. Continue monitoring symptoms for improvement/exacerbation.      Patient wants 14 days of prednisone 40 mg daily.  Discussed would like her to try this treatment and use rescue mdi am and pm also and prn in between.  See back if not resolving in the next 1-2 weeks, or sooner if worsening or changing.      In regards to concerns with inflammation/itchiness on the right leg, the patient received a prescription for hydrocortisone 2.5 % cream. She was instructed to apply this cream to the affected area 2 times daily PRN for treatment of this condition for several days and to use good moisturizer also. Told her should not use on her face, she has no rash or lesions on her face.  Asked her to follow up w derm for \"inflammation of face\" Continue monitoring symptoms for improvement/exacerbation.    Other wise she will follow up w PCP for next regular OV for chronic medical problems    Scribe Attestation  By signing my name below, IIno , Mohsenibe   attest that this documentation has been prepared under the direction and in the presence of Sharri Reddy DO.    "

## 2024-04-30 ENCOUNTER — TELEPHONE (OUTPATIENT)
Dept: INTEGRATIVE MEDICINE | Facility: CLINIC | Age: 39
End: 2024-04-30
Payer: COMMERCIAL

## 2024-05-16 ENCOUNTER — OFFICE VISIT (OUTPATIENT)
Dept: OTOLARYNGOLOGY | Facility: CLINIC | Age: 39
End: 2024-05-16
Payer: COMMERCIAL

## 2024-05-16 ENCOUNTER — APPOINTMENT (OUTPATIENT)
Dept: OTOLARYNGOLOGY | Facility: CLINIC | Age: 39
End: 2024-05-16
Payer: COMMERCIAL

## 2024-05-16 VITALS — HEIGHT: 61 IN | BODY MASS INDEX: 49.81 KG/M2 | WEIGHT: 263.8 LBS | TEMPERATURE: 97.4 F

## 2024-05-16 DIAGNOSIS — K21.9 LARYNGOPHARYNGEAL REFLUX (LPR): Primary | ICD-10-CM

## 2024-05-16 DIAGNOSIS — R09.89 THROAT CLEARING: ICD-10-CM

## 2024-05-16 DIAGNOSIS — J34.2 DEVIATED NASAL SEPTUM: ICD-10-CM

## 2024-05-16 DIAGNOSIS — R05.2 SUBACUTE COUGH: ICD-10-CM

## 2024-05-16 PROCEDURE — 31231 NASAL ENDOSCOPY DX: CPT | Performed by: OTOLARYNGOLOGY

## 2024-05-16 PROCEDURE — 99204 OFFICE O/P NEW MOD 45 MIN: CPT | Performed by: OTOLARYNGOLOGY

## 2024-05-16 RX ORDER — OMEPRAZOLE 20 MG/1
40 CAPSULE, DELAYED RELEASE ORAL DAILY
Qty: 60 CAPSULE | Refills: 1 | Status: SHIPPED | OUTPATIENT
Start: 2024-05-16 | End: 2025-05-16

## 2024-05-16 NOTE — PROGRESS NOTES
Chief Complaint:  Postnasal drainage and cough/dysphonia     History Of Present Illness:  Reason For Visit:     Radha presents as a new patient today but I have seen her in the past the last time in 2020.  Her main concern today involves postnasal drainage and associated coughing and hoarseness.    Over the past 7 weeks (was well before this) she's had difficulty breathing, poor sleep, she then developed constant cough, posterior nasal drainage, and vocal changes. She feels her voice becomes worse throughout the day. At night she feels her cough is worse and she has had difficulty breathing.  She does sometimes need albuterol throughout the day.  She takes Singulair and feels that she gets some benefit with this.    Her coughing seems to be worse after eating particularly spicy foods.  She was given a recent 5-day course of prednisone that did not provide significant benefit.  She is using Flonase 2 sprays each side once per day with some measure of improvement.    Main Symptoms:  Patient has anterior nasal drainage.     Patient has  posterior nasal drainage.    Patient does not have nasal airway obstruction.   Patient does not have  facial pain.    Patient has  facial pressure.    Patient does not have decreased sense of smell. Decreased 0 % of normal.   Associated Symptoms:   Patient does not have  headaches.    Patient has throat clearing.    Patient has coughing.    Patient has dysphonia.   Patient does not have sneezing.   Patient does not have itchy eyes.   Patient does not have nasal bleeding.     Medications currently on for sinonasal symptoms: Flonase 2 puff each side once daily   Medications tried in the past for sinonasal symptoms:  none     Other Pertinent Medical Conditions:   Patient has asthma.    Patient does not have aspirin sensitivity.    Patient does not have migraines.    Patient has history of allergy testing. When: several years prior though positive, unsure of the results of this. Patient  does not have history of IT.   Patient does not have history of sinus surgery.    Patient does not have history of nasal fracture.    Patient does not have heartburn.    The patient does not take medical therapy for heartburn.   The patient has a GI evaluation.    The patient does not have imaging of sinuses.     Active Problems:  Patient Active Problem List   Diagnosis    Diarrhea    Abnormal CT scan, sinus    Anxiety and depression    Arthropathy of left temporomandibular joint    Atypical chest pain    Bilateral edema of lower extremity    Brain tumor (Multi)    Calcification of brain    Chiari I malformation (Multi)    Chronic anemia    Ulcerative colitis (Multi)    Erythema nodosum    Exocrine pancreatic insufficiency ([facility])    Hematochezia    Hidradenitis suppurativa    Hyperglycemia    Hyperlipidemia, mild    Leukocytosis    Pseudotumor cerebri    Raised intracranial pressure    Vitamin D deficiency    Other fatigue      Past Medical History:  She has a past medical history of Low back pain, unspecified (07/27/2022), Personal history of other diseases of the digestive system, Personal history of other infectious and parasitic diseases, and Ulcerative colitis, unspecified, without complications (Multi) (10/20/2022).    Surgical History:  She has a past surgical history that includes Other surgical history (02/05/2019); Other surgical history (02/05/2019); Other surgical history (06/22/2020); and Other surgical history (06/22/2020).     Family History:  Family History   Problem Relation Name Age of Onset    Breast cancer Mother  35    No Known Problems Father      Breast cancer Mother's Sister  25    Lung cancer Father's Sister  60 - 69    Testicular cancer Maternal Grandfather  50    Ovarian cancer Other  40 - 49        maternal cousin     Social History:  She reports that she has never smoked. She has never been exposed to tobacco smoke. She has never used smokeless tobacco. She reports that she does not  currently use alcohol. She reports that she does not currently use drugs.     Allergies:  Ceftibuten, Cefuroxime axetil, and Nsaids (non-steroidal anti-inflammatory drug)    Current Meds:  Current Outpatient Medications:     albuterol 90 mcg/actuation inhaler, Inhale 2 puffs every 6 hours if needed., Disp: , Rfl:     benzonatate (Tessalon) 100 mg capsule, Take 1 capsule (100 mg) by mouth 3 times a day as needed., Disp: , Rfl:     biotin 1 mg capsule, Take 1 capsule (1 mg) by mouth once daily., Disp: , Rfl:     cholecalciferol (Vitamin D-3) 125 MCG (5000 UT) capsule, Take 1 capsule (125 mcg) by mouth once daily., Disp: , Rfl:     cyanocobalamin, vitamin B-12, (Vitamin B-12) 1,000 mcg tablet extended release, Take 1 tablet (1,000 mcg) by mouth once daily., Disp: , Rfl:     fluticasone (Flonase) 50 mcg/actuation nasal spray, USE 1 TO 2 SPRAYS INTO EACH NOSTRIL ONCE DAILY, Disp: 16 mL, Rfl: 2    hydrocortisone 2.5 % cream, Apply topically 2 times a day as needed for irritation or rash for up to 7 days., Disp: 30 g, Rfl: 0    iron, carbonyl 25 mg iron tablet, Take 1 tablet by mouth once daily., Disp: , Rfl:     mesalamine ER (Apriso) 0.375 gram 24 hr capsule, Take 4 capsules (1.5 g) by mouth once daily., Disp: , Rfl:     montelukast (Singulair) 10 mg tablet, Take 1 tablet (10 mg) by mouth once daily at bedtime., Disp: 30 tablet, Rfl: 1    pyridoxine (Vitamin B-6) 50 mg tablet, Take 1 tablet (50 mg) by mouth once daily., Disp: , Rfl:     Vitals:  Visit Vitals  OB Status Having periods   Smoking Status Never      Physical Exam:  CONSTITUTIONAL:  Vitals reviewed in nursing chart, well developed, well nourished.  RESPIRATION:  Breathing comfortably, no stridor.  CV:  No clubbing/cyanosis/edema in hands.  EYES:  EOM Intact, sclera normal.  NEURO:  Alert and oriented times 3, Cranial nerves 2-12 intact and symmetric bilaterally.  HEAD AND FACE:  Skin with no masses or lesions, sinuses nontender to palpation.  SALIVARY GLANDS:   Parotid and submandibular glands normal bilaterally.  EARS:  Normal external ears, external auditory canals, and TMs to otoscopy, normal hearing to whispered voice.  NOSE:  External nose midline, anterior rhinoscopy is normal with limited visualization to the anterior aspect of the inferior turbinates (see nasal endoscopy)..  ORAL CAVITY/OROPHARYNX/LIPS:  Normal mucous membranes, normal floor of mouth/tongue/OP, no masses or lesions are noted.  PHARYNGEAL WALLS AND NASOPHARYNX:  No masses noted.  NECK/LYMPH:  No LAD, no thyroid masses.  LARYNX:  No lesions noted.  Normal vocal fold mobility bilaterally.    SINONASAL ENDOSCOPY (CPT 00618): To better evaluate the patient's symptoms, sinonasal endoscopy is indicated.  After discussion of risks and benefits, and topical decongestion and anesthesia,an endoscope was used to perform nasal endoscopy on each side.  A time out identifying the patient, the procedure, the location of the procedure and any concerns was performed prior to beginning the procedure.    Findings:  Examination of the right nasal cavity revealed no evidence of purulence or polyposis at the middle meatus or sphenoethmoid recess.  Examination of the left nasal cavity revealed no evidence of purulence or polyposis at the middle meatus or sphenoethmoid recess.  She has a septal deviation to the left.    Provider Impressions:  1. Rhinorrhea  2. Facial pressure  3. Throat clearing, coughing, dysphonia; ? laryngopharyngeal reflux   4. Asthma, allergic rhinitis   5. Ulcerative colitis following with gastroenterology  6. Chiari malformation - followed by Dr. Christophe Hess     Discussion:  Radha Smith and I discussed her symptoms and exam.  I do not see anything concerning with her larynx today but if these issues persist despite some medical strategies I think she would do well seeing one of my voice partners to look at muscle tension issues in her throat.  During her evaluation today with me she was having  very frequent throat clearing and she mentioned that this was worsened by application of the lidocaine but has been present despite this previously.  Her worsening of cough when laying down at night and after oral intake (particularly with spicy foods) is suspicious for reflux and she was comfortable utilizing omeprazole as her first step in management for this issue.  I recommended discontinuation for any side effects.  An information sheet was provided on reflux management strategies and recommendations on how to take the proton pump inhibitor.    Her nasal endoscopy did not demonstrate any concerning findings.  Prior imaging when I was following with her in 2020 demonstrated mild inflammatory changes in her sinuses sinuses but there was some intracranial findings and she was ultimately seen by Dr. Hess and diagnosed with a Chiari malformation that they are following.    In regard to her baseline sinonasal symptoms, I recommended continuation of Flonase.  There is certainly a number of other intranasal medical therapy she could try including different derivatives of steroid, topical antihistamine, or a drying agent such as ipratropium.  Given that I am suspicious for reflux in her I would not suggest these other medical trials currently but it certainly something we could discuss if her symptoms persist.    I recommended that she coordinate a follow-up with one of my voice partners in about 6 weeks in the event that her symptoms persist for repeat assessment specifically targeting her larynx.  If her symptoms improve with the reflux therapy but then recur I think there is still value seeing one of my voice partners but she may also want to discuss reflux strategies with her primary care provider.      If her coughing and throat clearing improve but she continues with drainage and facial pressure symptomatology I would like to see her back in clinic to discuss additional medical strategies.  I would not suggest  further prednisone as recent prednisone did not provide significant benefit.  She was amenable to this and all questions were answered.    Patient Discussion/Summary:  Welcome to Dr. Narendra Levi's clinic. We are here to assist you with your ENT needs at [facility]. Dr. Levi is an ENT that specializes in nose, sinus, and skull base disorders.    Dr. Narendra Levi's office number is [phone]. Please call this number to contact his care team regardless of which office you use to access care. This number is the most direct way to communicate with all the members of the care team.    Karime Miranda CNP is a nurse practitioner who is a part of Dr. Levi's team. She will work collaboratively with Dr. Levi to meet your goals. This often may include seeing you for more urgent appointments or follow-up visits under Dr. Levi's supervision.    Anne-Marie Soto RN BSN is Dr. Levi's primary nurse. She can be reached by calling [phone]. Anne-Marie is available during business hours Tuesday through Friday. Karime Vaca RN BSN is her rhinology nurse partner. Karime is available during business hours Monday through Thursday. Messages left for them will be returned within one business day. Anne-Marie is also Dr. Levi's surgery scheduler and will assist you with planning and scheduling of your surgery during her office hours.     Tanika Worthy is Dr. Levi's  and you can reach her at [phone]. She can help you with scheduling of appointments, general questions and information. She is available to receive calls Monday through Friday from 8:00 am until 4:25 pm.     For your convenience, Dr. Levi sees patients at [facility] and [facility]. While we try to make your appointments as convenient as possible, occasionally a visit to another location may be necessary to provide the best care for you.    Dr. Levi  makes every effort to run on time for your appointments. Therefore, if you are more than 25 minutes late, your appointment will need to be rescheduled to another day. We appreciate your understanding.     We look forward to working with you to meet your healthcare goals.     Signature:  Scribe Attestation  By signing my name below, I, Ladonna Gonzalez Rita   attest that this documentation has been prepared under the direction and in the presence of Narendra Levi MD.

## 2024-05-18 DIAGNOSIS — J45.21 MILD INTERMITTENT REACTIVE AIRWAY DISEASE WITH ACUTE EXACERBATION (HHS-HCC): ICD-10-CM

## 2024-05-21 RX ORDER — MONTELUKAST SODIUM 10 MG/1
10 TABLET ORAL NIGHTLY
Qty: 90 TABLET | Refills: 1 | Status: SHIPPED | OUTPATIENT
Start: 2024-05-21

## 2024-06-06 DIAGNOSIS — E66.01 MORBID OBESITY (MULTI): Primary | ICD-10-CM

## 2024-06-10 ENCOUNTER — OFFICE VISIT (OUTPATIENT)
Dept: PRIMARY CARE | Facility: CLINIC | Age: 39
End: 2024-06-10
Payer: COMMERCIAL

## 2024-06-10 ENCOUNTER — HOSPITAL ENCOUNTER (OUTPATIENT)
Dept: RADIOLOGY | Facility: CLINIC | Age: 39
Discharge: HOME | End: 2024-06-10
Payer: COMMERCIAL

## 2024-06-10 VITALS
WEIGHT: 270 LBS | HEART RATE: 73 BPM | SYSTOLIC BLOOD PRESSURE: 121 MMHG | DIASTOLIC BLOOD PRESSURE: 74 MMHG | BODY MASS INDEX: 51.02 KG/M2 | OXYGEN SATURATION: 98 %

## 2024-06-10 DIAGNOSIS — M54.50 ACUTE RIGHT-SIDED LOW BACK PAIN WITHOUT SCIATICA: Primary | ICD-10-CM

## 2024-06-10 DIAGNOSIS — M54.50 ACUTE RIGHT-SIDED LOW BACK PAIN WITHOUT SCIATICA: ICD-10-CM

## 2024-06-10 PROCEDURE — 1036F TOBACCO NON-USER: CPT | Performed by: FAMILY MEDICINE

## 2024-06-10 PROCEDURE — 72110 X-RAY EXAM L-2 SPINE 4/>VWS: CPT

## 2024-06-10 PROCEDURE — 99214 OFFICE O/P EST MOD 30 MIN: CPT | Performed by: FAMILY MEDICINE

## 2024-06-10 PROCEDURE — 72110 X-RAY EXAM L-2 SPINE 4/>VWS: CPT | Performed by: RADIOLOGY

## 2024-06-10 RX ORDER — CYCLOBENZAPRINE HCL 5 MG
5 TABLET ORAL NIGHTLY PRN
Qty: 30 TABLET | Refills: 0 | Status: SHIPPED | OUTPATIENT
Start: 2024-06-10 | End: 2024-08-09

## 2024-06-10 ASSESSMENT — PAIN SCALES - GENERAL: PAINLEVEL: 6

## 2024-06-10 NOTE — PROGRESS NOTES
Subjective   Patient ID: Radha Smith is a 39 y.o. female who presents for Back Pain (BACK PAIN MOVING TO STOMACH /LAST WEEK THURSDAY JUNE 06, 2024 /).    HPI   The patient presents to the clinic with concerns of back pain. She has past medical history of hyperlipidemia, hyperglycemia, vitamin D deficiency, ulcerative colitis, brain tumor, chronic anemia, anxiety, depression, HS, and bilateral edema of lower extremities.    This is a patient of Dr. Adelina Kee Pla (PCP).    The patient states that she has been suffering from back pain since Thursday (6/6/2024). The pain is located in the R Low back of her back. She states that the pain radiates towards the front of the body (abdomen region) around abdomen in dermatone type pattern to umbilicus.  No rash noted, pain does not cross midline.   She has been using ice and stretching exercises to relieve pain symptoms. Additionally, she states that she has been taking OTC ibuprofen in limited amounts (due to GI issues) for treatment of this condition. She denies any known injuries. However, she does note that she had been lifting (~20-30 lb items) on Thursday prior to experiencing these symptoms. She denies any chills, fever, nausea, and/or vomiting. She reports normal bowel movements and urination. She states that her back pain symptoms may interrupt her sleep at night occasionally.  She has had back pain in the ast, same area, did PT and is still doing the stretches which helps, she states pain is owrse when she wakes up.      The patient is planning to travel to [place], [place] on 06/20/2024.  She is concerned pain will worsen with travel, she is flying on this trip    Review of Systems    Objective   /74 (BP Location: Right arm, Patient Position: Sitting, BP Cuff Size: Large adult long)   Pulse 73   Wt 122 kg (270 lb)   SpO2 98%   BMI 51.02 kg/m²     Physical Exam  Constitutional:       Appearance: Normal appearance.   Cardiovascular:       Rate and Rhythm: Normal rate and regular rhythm.      Pulses: Normal pulses.      Heart sounds: Normal heart sounds.   Pulmonary:      Effort: Pulmonary effort is normal.      Breath sounds: Normal breath sounds.   Abdominal:      General: Bowel sounds are normal. There is no distension.      Palpations: Abdomen is soft. There is no mass.      Tenderness: There is no abdominal tenderness.      Hernia: No hernia is present.   Musculoskeletal:         General: Normal range of motion.      Cervical back: Normal range of motion and neck supple. No tenderness.      Right lower leg: No edema.      Left lower leg: No edema.      Comments: Pt has tenderness R mid lumbar area with palpable spasm, decreased flexion.  DTR's WNL.  Straight leg raising Pos on the R.  Great toe strength wnl.   Lymphadenopathy:      Cervical: No cervical adenopathy.   Skin:     General: Skin is warm and dry.      Comments: No rash consistant w start of shingles, pain in dermatome distributiion   Neurological:      General: No focal deficit present.      Mental Status: She is alert and oriented to person, place, and time.      Sensory: No sensory deficit.      Motor: No weakness.      Gait: Gait normal.      Deep Tendon Reflexes: Reflexes normal.   Psychiatric:         Mood and Affect: Mood normal.         Thought Content: Thought content normal.         Judgment: Judgment normal.         Assessment/Plan   Problem List Items Addressed This Visit    None  Visit Diagnoses         Codes    Acute right-sided low back pain without sciatica    -  Primary M54.50    Relevant Medications    cyclobenzaprine (Flexeril) 5 mg tablet    Other Relevant Orders    XR lumbar spine complete 4+ views    Referral to Physical Therapy               In regards to concerns with diffuse back pain (RLQ region of back), differential diagnoses (degenerative changes in spine, muscle strain, etc) were discussed with the patient. Following discussion, the patient received an  order for an x-ray of the lumbar spine. The clinic will contact the patient upon receiving her imaging results. In addition, the patient received a referral to physical therapy for additional treatment of this condition. The patient also received a Flexeril 5 mg prescription for treatment of night-time muscle spasms, se profile discussed. She was instructed to take 1 tablet (5 mg) of Flexeril by mouth as needed. Continue monitoring for potential side-effects to Flexeril medication. Continue monitoring symptoms for improvement/exacerbation.  Follow up of back pain in 3-4 weeks or prn sooner.  Pt not having GI symptoms, hx of colitis and no sign of shingles rash, she will return sooner if s/s change or worsen    Scribe Attestation  By signing my name below, I, Rita Wallace   attest that this documentation has been prepared under the direction and in the presence of Sharri Reddy DO.

## 2024-06-11 ENCOUNTER — APPOINTMENT (OUTPATIENT)
Dept: PRIMARY CARE | Facility: CLINIC | Age: 39
End: 2024-06-11
Payer: COMMERCIAL

## 2024-06-12 ENCOUNTER — APPOINTMENT (OUTPATIENT)
Dept: SLEEP MEDICINE | Facility: CLINIC | Age: 39
End: 2024-06-12
Payer: COMMERCIAL

## 2024-06-13 ENCOUNTER — TELEPHONE (OUTPATIENT)
Dept: PRIMARY CARE | Facility: CLINIC | Age: 39
End: 2024-06-13
Payer: COMMERCIAL

## 2024-06-13 NOTE — PROGRESS NOTES
Radha Smith female   1985 39 y.o.   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 She denies breast surgery or biopsy.     She was referred to Genetic for testing but after much thought and consideration she declined testing. She declined referral to high risk gynecology also.     BREAST IMAGIN2023 bilateral diagnostic mammogram and ultrasound indicated BI-RADS Category 3, normal left axillary lymph node to area of palpable concern, left breast probably benign cyst cluster 3:00 11 cm from nipple.  Short-term follow-up recommended.  10/9/2023 left breast ultrasound BI-RADS Category 3, stable probably benign breast cyst cluster 3:00 11 cm from the nipple 6 x 4 x 5 mm.    REPRODUCTIVE HISTORY: menarche age 10, nullipara  premenopausal,  scattered breast tissue     FAMILY CANCER HISTORY:   Mother: breast cancer age 35, multiple recurrence,   Maternal aunt: breast cancer age 25, BRCA + (unsure 1 or 2)  Maternal grandfather: testicular cancer age 50  Maternal Uncle: lung cancer 60s  Maternal cousin: ovarian cancer 40s      REVIEW OF SYSTEMS    Constitutional:  Negative for appetite change, fatigue, fever and unexpected weight change.   HENT:  Negative for ear pain, hearing loss, nosebleeds, sore throat and trouble swallowing.    Eyes:  Negative for discharge, itching and visual disturbance.   Respiratory:  Negative for cough, chest tightness and shortness of breath.    Cardiovascular:  Negative for chest pain, palpitations and leg swelling.   Breast: as indicated in HPI  Gastrointestinal:  Negative for abdominal pain, constipation, diarrhea and nausea.   Endocrine: Negative for cold intolerance and heat intolerance.   Genitourinary:  Negative for dysuria, frequency, hematuria, pelvic pain and vaginal bleeding.   Musculoskeletal:   Negative for arthralgias, back pain, gait problem, joint swelling and myalgias.   Skin:  Negative for color change and rash.   Allergic/Immunologic: Negative for environmental allergies and food allergies.   Neurological:  Negative for dizziness, tremors, speech difficulty, weakness, numbness and headaches.   Hematological:  Does not bruise/bleed easily.   Psychiatric/Behavioral:  Negative for agitation, dysphoric mood and sleep disturbance. The patient is not nervous/anxious.         MEDICATIONS  Current Outpatient Medications   Medication Instructions    albuterol 90 mcg/actuation inhaler 2 puffs, inhalation, Every 6 hours PRN    benzonatate (TESSALON) 100 mg, oral, 3 times daily PRN    biotin 1 mg capsule 1 capsule, oral, Daily    cholecalciferol (Vitamin D-3) 125 MCG (5000 UT) capsule 1 capsule, oral, Daily    cyanocobalamin, vitamin B-12, (Vitamin B-12) 1,000 mcg tablet extended release 1 tablet, oral, Daily    cyclobenzaprine (FLEXERIL) 5 mg, oral, Nightly PRN    fluticasone (Flonase) 50 mcg/actuation nasal spray Each Nostril, Daily    hydrocortisone 2.5 % cream Topical, 2 times daily PRN    iron, carbonyl 25 mg iron tablet 1 tablet, oral, Daily    mesalamine ER (APRISO) 1.5 g, oral, Daily    montelukast (SINGULAIR) 10 mg, oral, Nightly    omeprazole (PRILOSEC) 40 mg, oral, Daily, Do not crush or chew.    pyridoxine (Vitamin B-6) 50 mg tablet 1 tablet, oral, Daily        ALLERGIES  Allergies   Allergen Reactions    Ceftibuten Other     nausea    Cefuroxime Axetil Diarrhea and Nausea Only    Nsaids (Non-Steroidal Anti-Inflammatory Drug) Other     can cause bleeding, pt has UC        Past Medical History:   Diagnosis Date    Low back pain, unspecified 07/27/2022    Chronic left-sided low back pain without sciatica    Personal history of other diseases of the digestive system     History of proctitis    Personal history of other infectious and parasitic diseases     History of gonorrhea of rectum    Ulcerative  colitis, unspecified, without complications (Multi) 10/20/2022    Ulcerative colitis      Past Surgical History:   Procedure Laterality Date    BREAST CYST ASPIRATION Left     Axilla    OTHER SURGICAL HISTORY  02/05/2019    [place] tooth extraction    OTHER SURGICAL HISTORY  02/05/2019    Gallbladder surgery    OTHER SURGICAL HISTORY  06/22/2020    Colonoscopy    OTHER SURGICAL HISTORY  06/22/2020    Esophagogastroduodenoscopy       Cancer-related family history includes Breast cancer (age of onset: 25) in her mother's sister; Breast cancer (age of onset: 35) in her mother; Lung cancer (age of onset: 60 - 69) in her father's sister; Ovarian cancer (age of onset: 40 - 49) in an other family member; Testicular cancer (age of onset: 50) in her maternal grandfather.       SOCIAL HISTORY      Social History     Tobacco Use    Smoking status: Never     Passive exposure: Never    Smokeless tobacco: Never   Substance Use Topics    Alcohol use: Not Currently        VITALS  Vitals:    06/17/24 0921   BP: 115/69   Pulse: 62          PHYSICAL EXAM  Patient is alert and oriented x3, with appropriate mood. The gait is steady and hand grasps are equal. Sclera clear. The breasts are nearly symmetrical. The tissue is soft without palpable abnormalities, discrete nodules or masses. The skin and nipples appear normal. There is no cervical, supraclavicular, or axillary lymphadenopathy palpable. Heart rate and rhythm normal, S1 and S2 appreciated. The lungs are clear bilaterally. Abdomen is soft & non-tender.      IMAGING  BI US breast limited left 06/17/2024  BI mammo bilateral diagnostic tomosynthesis 06/17/2024    Narrative  Interpreted By:  Jose Clinton,  STUDY:  BI MAMMO BILATERAL DIAGNOSTIC TOMOSYNTHESIS; BI US BREAST LIMITED  LEFT;  6/17/2024 8:34 am; 6/17/2024 8:57 am      INDICATION:  Annual screening with short-term follow-up of a probably benign left  breast mass. Patient has a family history of breast cancer in her  mother at  age 35.    COMPARISON:  06/19/2023, 11/21/2020, 10/09/2023    FINDINGS:  MAMMOGRAPHY: 2D and tomosynthesis images were reviewed at 1 mm slice  thickness.    Density:  There are areas of scattered fibroglandular tissue.    Previously-seen left lateral breast mass is no longer visualized. No  suspicious masses or calcifications are identified.    ULTRASOUND: Targeted ultrasound was performed of the left breast by a  registered sonographer with elastography. In the left breast at 3  o'clock, 11 cm from the nipple, there has been a significant interval  decrease in the size of a circumscribed hypoechoic mass which is soft  on elastography and does not contain flow, now measuring 2 x 2 x 2 mm  (previously 6 x 4 x 5 mm). Given the decrease in size, this is benign  and requires no additional follow-up.    Impression  No mammographic or targeted sonographic evidence of malignancy.  Previously-seen left breast mass has significantly decreased in size  and is benign. Patient may resume annual screening.    BI-RADS Category:  2 Benign.  Recommendation:  Annual Screening.  Recommended Date:  1 Year.  Laterality:  Bilateral.        Time was spent viewing digital images of the radiology testing with the patient. I explained the results in depth, along with suggested explanation for follow up recommendations based on the testing results.      RISK PROFILE            ORDERS  Orders Placed This Encounter   Procedures    BI mammo bilateral screening tomosynthesis     Standing Status:   Future     Standing Expiration Date:   8/17/2025     Order Specific Question:   Perform a breast ultrasound if clinically indicated by Radiologist?     Answer:   Yes     Order Specific Question:   Previous Mamm performed at  location?     Answer:   Yes     Order Specific Question:   Reason for exam:     Answer:   clinic screen     Order Specific Question:   Radiologist to Determine Optimal Study     Answer:   Yes     Order Specific Question:    Release result to Z Plane     Answer:   Immediate     Order Specific Question:   Is this exam part of a Research Study? If Yes, link this order to the research study     Answer:   No     Order Specific Question:   Is the patient pregnant?     Answer:   No          ASSESSMENT/PLAN    1. Healthcare maintenance  BI mammo bilateral screening tomosynthesis    Clinic Appointment Request      2. Breast cancer screening, high risk patient          HIGH RISK PLAN  Yearly mammogram with digital breast tomosynthesis  Twice yearly clinical breast examinations  Breast MRI (to schedule call [phone]) she declines  Monthly self breast examinations &/or regular self breast awareness  Vitamin D3 2000 IU/daily (over the counter) unless your PCP recommends you take a specific dose  Exercise 3-4 times per week for 45-60 minutes  Limit alcohol to 3-4 drinks per week if you are menopausal  Eat healthy low-fat diet with lots of vegetable & fruits  Risk models indicate personal risk of breast cancer in the next 5 years and lifetime (age 85-90):  Breast Cancer Risk Assessment Tool (Keli): 5-year risk 0.9% (average 0.6%), lifetime risk 15% (average 9.9%).   Nela: 5-year risk % (average 0.6%), lifetime risk 32.9%, (average 10.8%)    She is eligible for endocrine therapy with Tamoxifen, and also Raloxifene or Aromatase inhibitor if/when menopausal. Endocrine therapy reduces lifetime risk of breast cancer by 50% when taken for 5 years. There is an alternative low dose Tamoxifen which can be taken for only 3 years.   She declined endocrine therapy and MRI.    Kimberly August, [facility]

## 2024-06-13 NOTE — TELEPHONE ENCOUNTER
----- Message from Sharri Reddy DO sent at 6/12/2024  9:08 PM EDT -----  Report to pt her xray of low back shows some mild arthritic changes only.  She should proceed with physical therapy as discussed at her OV

## 2024-06-17 ENCOUNTER — HOSPITAL ENCOUNTER (OUTPATIENT)
Dept: RADIOLOGY | Facility: CLINIC | Age: 39
Discharge: HOME | End: 2024-06-17
Payer: COMMERCIAL

## 2024-06-17 ENCOUNTER — OFFICE VISIT (OUTPATIENT)
Dept: SURGICAL ONCOLOGY | Facility: CLINIC | Age: 39
End: 2024-06-17
Payer: COMMERCIAL

## 2024-06-17 VITALS
SYSTOLIC BLOOD PRESSURE: 115 MMHG | HEART RATE: 62 BPM | BODY MASS INDEX: 49.63 KG/M2 | DIASTOLIC BLOOD PRESSURE: 69 MMHG | WEIGHT: 267 LBS

## 2024-06-17 VITALS — BODY MASS INDEX: 49.69 KG/M2 | HEIGHT: 62 IN | WEIGHT: 270 LBS

## 2024-06-17 DIAGNOSIS — R92.8 ABNORMAL MAMMOGRAM OF LEFT BREAST: ICD-10-CM

## 2024-06-17 DIAGNOSIS — Z12.39 BREAST CANCER SCREENING, HIGH RISK PATIENT: ICD-10-CM

## 2024-06-17 DIAGNOSIS — N60.02 BREAST CYST, LEFT: ICD-10-CM

## 2024-06-17 DIAGNOSIS — Z00.00 HEALTHCARE MAINTENANCE: Primary | ICD-10-CM

## 2024-06-17 PROCEDURE — 99214 OFFICE O/P EST MOD 30 MIN: CPT | Performed by: NURSE PRACTITIONER

## 2024-06-17 PROCEDURE — 77062 BREAST TOMOSYNTHESIS BI: CPT

## 2024-06-17 PROCEDURE — 77066 DX MAMMO INCL CAD BI: CPT | Performed by: STUDENT IN AN ORGANIZED HEALTH CARE EDUCATION/TRAINING PROGRAM

## 2024-06-17 PROCEDURE — 1036F TOBACCO NON-USER: CPT | Performed by: NURSE PRACTITIONER

## 2024-06-17 PROCEDURE — 76642 ULTRASOUND BREAST LIMITED: CPT | Performed by: STUDENT IN AN ORGANIZED HEALTH CARE EDUCATION/TRAINING PROGRAM

## 2024-06-17 PROCEDURE — 76642 ULTRASOUND BREAST LIMITED: CPT | Mod: LT

## 2024-06-17 PROCEDURE — 77062 BREAST TOMOSYNTHESIS BI: CPT | Performed by: STUDENT IN AN ORGANIZED HEALTH CARE EDUCATION/TRAINING PROGRAM

## 2024-06-17 PROCEDURE — 76982 USE 1ST TARGET LESION: CPT | Mod: LT

## 2024-06-17 ASSESSMENT — PAIN SCALES - GENERAL: PAINLEVEL: 0-NO PAIN

## 2024-06-18 DIAGNOSIS — K21.9 LARYNGOPHARYNGEAL REFLUX (LPR): ICD-10-CM

## 2024-06-19 RX ORDER — OMEPRAZOLE 20 MG/1
40 CAPSULE, DELAYED RELEASE ORAL DAILY
Qty: 180 CAPSULE | Refills: 1 | OUTPATIENT
Start: 2024-06-19 | End: 2025-06-19

## 2024-06-24 NOTE — TELEPHONE ENCOUNTER
Subjective: Caller states \"I'm calling to see if my insurance company will cover an ER visit. \"     Current Symptoms: Back pain, triaged earlier, now has nausea. Went to urgent care and was told to go to ED due to need for possible CT scan. Advised by [facility] provider to go to ED. Asking if insurance will cover. Writer advised patient to follow instructions from [facility] provider and stated customer care could assist with coverage questions if needed, but claim is evaluated after it is received. No triage needed. Patient states she will go to ED. Care advice provided, patient verbalizes understanding; denies any other questions or concerns; instructed to call back for any new or worsening symptoms. This triage is a result of a call to [address] [place] [address]. Please do not respond to the triage nurse through this encounter. Any subsequent communication should be directly with the patient. Reason for Disposition   Caller has already spoken with another triager or PCP (or office), and has further questions and triager able to answer questions.     Protocols used: No Contact or Duplicate Contact Call-ADULT-[place]
24-Jun-2024 16:51

## 2024-07-11 ENCOUNTER — APPOINTMENT (OUTPATIENT)
Dept: NUTRITION | Facility: CLINIC | Age: 39
End: 2024-07-11
Payer: COMMERCIAL

## 2024-07-11 ENCOUNTER — APPOINTMENT (OUTPATIENT)
Dept: OTOLARYNGOLOGY | Facility: CLINIC | Age: 39
End: 2024-07-11
Payer: COMMERCIAL

## 2024-07-29 ENCOUNTER — APPOINTMENT (OUTPATIENT)
Dept: GASTROENTEROLOGY | Facility: CLINIC | Age: 39
End: 2024-07-29
Payer: COMMERCIAL

## 2024-08-13 ENCOUNTER — APPOINTMENT (OUTPATIENT)
Dept: NUTRITION | Facility: CLINIC | Age: 39
End: 2024-08-13
Payer: COMMERCIAL

## 2024-09-24 ENCOUNTER — OFFICE VISIT (OUTPATIENT)
Dept: PRIMARY CARE | Facility: CLINIC | Age: 39
End: 2024-09-24
Payer: COMMERCIAL

## 2024-09-24 ENCOUNTER — LAB (OUTPATIENT)
Dept: LAB | Facility: LAB | Age: 39
End: 2024-09-24
Payer: COMMERCIAL

## 2024-09-24 VITALS
BODY MASS INDEX: 50.19 KG/M2 | DIASTOLIC BLOOD PRESSURE: 83 MMHG | TEMPERATURE: 98 F | HEART RATE: 74 BPM | SYSTOLIC BLOOD PRESSURE: 136 MMHG | OXYGEN SATURATION: 98 % | WEIGHT: 270 LBS

## 2024-09-24 DIAGNOSIS — F41.9 ANXIETY AND DEPRESSION: Primary | ICD-10-CM

## 2024-09-24 DIAGNOSIS — F41.9 ANXIETY AND DEPRESSION: ICD-10-CM

## 2024-09-24 DIAGNOSIS — E78.5 HYPERLIPIDEMIA, MILD: ICD-10-CM

## 2024-09-24 DIAGNOSIS — R73.9 HYPERGLYCEMIA: ICD-10-CM

## 2024-09-24 DIAGNOSIS — F32.A ANXIETY AND DEPRESSION: ICD-10-CM

## 2024-09-24 DIAGNOSIS — D72.829 LEUKOCYTOSIS, UNSPECIFIED TYPE: ICD-10-CM

## 2024-09-24 DIAGNOSIS — D50.9 IRON DEFICIENCY ANEMIA, UNSPECIFIED IRON DEFICIENCY ANEMIA TYPE: ICD-10-CM

## 2024-09-24 DIAGNOSIS — F32.A ANXIETY AND DEPRESSION: Primary | ICD-10-CM

## 2024-09-24 PROCEDURE — 84443 ASSAY THYROID STIM HORMONE: CPT

## 2024-09-24 PROCEDURE — 1036F TOBACCO NON-USER: CPT | Performed by: FAMILY MEDICINE

## 2024-09-24 PROCEDURE — 80053 COMPREHEN METABOLIC PANEL: CPT

## 2024-09-24 PROCEDURE — 83540 ASSAY OF IRON: CPT

## 2024-09-24 PROCEDURE — 36415 COLL VENOUS BLD VENIPUNCTURE: CPT

## 2024-09-24 PROCEDURE — 85025 COMPLETE CBC W/AUTO DIFF WBC: CPT

## 2024-09-24 PROCEDURE — 99213 OFFICE O/P EST LOW 20 MIN: CPT | Performed by: FAMILY MEDICINE

## 2024-09-24 PROCEDURE — 83036 HEMOGLOBIN GLYCOSYLATED A1C: CPT

## 2024-09-24 PROCEDURE — 82728 ASSAY OF FERRITIN: CPT

## 2024-09-24 PROCEDURE — 80061 LIPID PANEL: CPT

## 2024-09-24 PROCEDURE — 83550 IRON BINDING TEST: CPT

## 2024-09-24 RX ORDER — SERTRALINE HYDROCHLORIDE 25 MG/1
25 TABLET, FILM COATED ORAL DAILY
Qty: 30 TABLET | Refills: 1 | Status: SHIPPED | OUTPATIENT
Start: 2024-09-24 | End: 2024-11-23

## 2024-09-24 ASSESSMENT — PAIN SCALES - GENERAL: PAINLEVEL: 0-NO PAIN

## 2024-09-24 NOTE — PROGRESS NOTES
Subjective   Patient ID: Radha Smith is a 39 y.o. female who presents for DISCUSS MEDICATION (ANXIETY MEDICATION ).    Here with complaints of anxiety.    Has been very stressed  Lost her job last year  Had to move in with family  New job in December  Has been stressful, very emotionally unsettled   Denies thoughts of suicide or homicide    Getting counseling regular.   Seeing weekly or more as needed  In the process of buying a house.   Has not had time to exercise  Dad recent had a stroke and put her stress up            Review of Systems    Objective   /83 (BP Location: Left arm, Patient Position: Sitting, BP Cuff Size: Adult long)   Pulse 74   Temp 36.7 °C (98 °F) (Temporal)   Wt 122 kg (270 lb)   SpO2 98%   BMI 50.19 kg/m²     Physical Exam  Vitals and nursing note reviewed.   Constitutional:       Appearance: Normal appearance.   Cardiovascular:      Rate and Rhythm: Normal rate and regular rhythm.   Pulmonary:      Effort: Pulmonary effort is normal.      Breath sounds: Normal breath sounds.   Musculoskeletal:      Cervical back: Normal range of motion.   Neurological:      Mental Status: She is alert.   Psychiatric:         Mood and Affect: Mood normal.         Behavior: Behavior normal.         Thought Content: Thought content normal.         Judgment: Judgment normal.         Assessment/Plan   Problem List Items Addressed This Visit             ICD-10-CM    Anxiety and depression - Primary F41.9, F32.A     Uncontrolled  Starting sertraline  Use and side effects reviewed  Continue counseling  Recheck in 1 month         Relevant Medications    sertraline (Zoloft) 25 mg tablet    Other Relevant Orders    TSH with reflex to Free T4 if abnormal    Hyperglycemia R73.9    Relevant Orders    Comprehensive Metabolic Panel    Hyperlipidemia, mild E78.5     Checking labs and treat accordingly         Relevant Orders    Comprehensive Metabolic Panel    Lipid Panel    Lipid Panel    Leukocytosis D72.829      Checking labs.          Relevant Orders    CBC and Auto Differential    CBC and Auto Differential

## 2024-09-24 NOTE — ASSESSMENT & PLAN NOTE
Uncontrolled  Starting sertraline  Use and side effects reviewed  Continue counseling  Recheck in 1 month

## 2024-09-25 DIAGNOSIS — D50.9 IRON DEFICIENCY ANEMIA, UNSPECIFIED IRON DEFICIENCY ANEMIA TYPE: Primary | ICD-10-CM

## 2024-09-25 LAB
ALBUMIN SERPL BCP-MCNC: 4.1 G/DL (ref 3.4–5)
ALP SERPL-CCNC: 75 U/L (ref 33–110)
ALT SERPL W P-5'-P-CCNC: 11 U/L (ref 7–45)
ANION GAP SERPL CALC-SCNC: 13 MMOL/L (ref 10–20)
AST SERPL W P-5'-P-CCNC: 14 U/L (ref 9–39)
BASOPHILS # BLD AUTO: 0.06 X10*3/UL (ref 0–0.1)
BASOPHILS NFR BLD AUTO: 0.4 %
BILIRUB SERPL-MCNC: 0.7 MG/DL (ref 0–1.2)
BUN SERPL-MCNC: 7 MG/DL (ref 6–23)
CALCIUM SERPL-MCNC: 8.9 MG/DL (ref 8.6–10.6)
CHLORIDE SERPL-SCNC: 103 MMOL/L (ref 98–107)
CHOLEST SERPL-MCNC: 144 MG/DL (ref 0–199)
CHOLESTEROL/HDL RATIO: 3.5
CO2 SERPL-SCNC: 27 MMOL/L (ref 21–32)
CREAT SERPL-MCNC: 0.49 MG/DL (ref 0.5–1.05)
EGFRCR SERPLBLD CKD-EPI 2021: >90 ML/MIN/1.73M*2
EOSINOPHIL # BLD AUTO: 0.07 X10*3/UL (ref 0–0.7)
EOSINOPHIL NFR BLD AUTO: 0.5 %
ERYTHROCYTE [DISTWIDTH] IN BLOOD BY AUTOMATED COUNT: 13.9 % (ref 11.5–14.5)
EST. AVERAGE GLUCOSE BLD GHB EST-MCNC: 108 MG/DL
FERRITIN SERPL-MCNC: 71 NG/ML (ref 8–150)
GLUCOSE SERPL-MCNC: 80 MG/DL (ref 74–99)
HBA1C MFR BLD: 5.4 %
HCT VFR BLD AUTO: 34.9 % (ref 36–46)
HDLC SERPL-MCNC: 40.9 MG/DL
HGB BLD-MCNC: 10.9 G/DL (ref 12–16)
IMM GRANULOCYTES # BLD AUTO: 0.06 X10*3/UL (ref 0–0.7)
IMM GRANULOCYTES NFR BLD AUTO: 0.4 % (ref 0–0.9)
IRON SATN MFR SERPL: 17 % (ref 25–45)
IRON SERPL-MCNC: 53 UG/DL (ref 35–150)
LDLC SERPL CALC-MCNC: 91 MG/DL
LYMPHOCYTES # BLD AUTO: 4.23 X10*3/UL (ref 1.2–4.8)
LYMPHOCYTES NFR BLD AUTO: 29.3 %
MCH RBC QN AUTO: 25.7 PG (ref 26–34)
MCHC RBC AUTO-ENTMCNC: 31.2 G/DL (ref 32–36)
MCV RBC AUTO: 82 FL (ref 80–100)
MONOCYTES # BLD AUTO: 0.77 X10*3/UL (ref 0.1–1)
MONOCYTES NFR BLD AUTO: 5.3 %
NEUTROPHILS # BLD AUTO: 9.27 X10*3/UL (ref 1.2–7.7)
NEUTROPHILS NFR BLD AUTO: 64.1 %
NON HDL CHOLESTEROL: 103 MG/DL (ref 0–149)
NRBC BLD-RTO: 0 /100 WBCS (ref 0–0)
PLATELET # BLD AUTO: 358 X10*3/UL (ref 150–450)
POTASSIUM SERPL-SCNC: 3.6 MMOL/L (ref 3.5–5.3)
PROT SERPL-MCNC: 7.4 G/DL (ref 6.4–8.2)
RBC # BLD AUTO: 4.24 X10*6/UL (ref 4–5.2)
SODIUM SERPL-SCNC: 139 MMOL/L (ref 136–145)
TIBC SERPL-MCNC: 316 UG/DL (ref 240–445)
TRIGL SERPL-MCNC: 59 MG/DL (ref 0–149)
TSH SERPL-ACNC: 0.47 MIU/L (ref 0.44–3.98)
UIBC SERPL-MCNC: 263 UG/DL (ref 110–370)
VLDL: 12 MG/DL (ref 0–40)
WBC # BLD AUTO: 14.5 X10*3/UL (ref 4.4–11.3)

## 2024-10-25 ENCOUNTER — APPOINTMENT (OUTPATIENT)
Dept: PRIMARY CARE | Facility: CLINIC | Age: 39
End: 2024-10-25
Payer: COMMERCIAL

## 2024-10-25 DIAGNOSIS — K21.9 LARYNGOPHARYNGEAL REFLUX (LPR): ICD-10-CM

## 2024-10-25 DIAGNOSIS — F41.9 ANXIETY AND DEPRESSION: Primary | ICD-10-CM

## 2024-10-25 DIAGNOSIS — F32.A ANXIETY AND DEPRESSION: Primary | ICD-10-CM

## 2024-10-25 PROCEDURE — 99213 OFFICE O/P EST LOW 20 MIN: CPT | Performed by: FAMILY MEDICINE

## 2024-10-25 PROCEDURE — 1036F TOBACCO NON-USER: CPT | Performed by: FAMILY MEDICINE

## 2024-10-25 RX ORDER — OMEPRAZOLE 20 MG/1
40 CAPSULE, DELAYED RELEASE ORAL DAILY
Qty: 60 CAPSULE | Refills: 1 | Status: SHIPPED | OUTPATIENT
Start: 2024-10-25 | End: 2025-10-25

## 2024-10-25 RX ORDER — SERTRALINE HYDROCHLORIDE 50 MG/1
50 TABLET, FILM COATED ORAL DAILY
Qty: 90 TABLET | Refills: 1 | Status: SHIPPED | OUTPATIENT
Start: 2024-10-25 | End: 2025-01-23

## 2024-10-25 NOTE — ASSESSMENT & PLAN NOTE
Improving.  Increasing sertraline to 50 mg daily.  Use was reviewed.  Continue counseling.  Recheck in 3 months or sooner if needed

## 2024-10-25 NOTE — PROGRESS NOTES
Subjective   Patient ID: Radha Smith is a 39 y.o. female who presents for No chief complaint on file..    [place] [facility] for follow up anxiety    Last visit started sertraline for worsening anxiety  Patient admits to doing much better.  Work is still stressful although tolerating better.  Still getting counseling every other week and more frequently if needed.  Denies any side effects to the medications.  Does admit recently her GERD has been worsening.    She has been exercising more frequently.  Trying to get to the gym 3 to 4 days/week, history of 5 days/week in the past.  Sleeping has improved         Review of Systems    Objective   There were no vitals taken for this visit.    Physical Exam  Constitutional:       Appearance: Normal appearance.   Neurological:      Mental Status: She is alert.   Psychiatric:         Mood and Affect: Mood normal.         Behavior: Behavior normal.         Thought Content: Thought content normal.         Judgment: Judgment normal.         Assessment/Plan   Problem List Items Addressed This Visit             ICD-10-CM    Anxiety and depression - Primary F41.9, F32.A     Improving.  Increasing sertraline to 50 mg daily.  Use was reviewed.  Continue counseling.  Recheck in 3 months or sooner if needed         Relevant Medications    sertraline (Zoloft) 50 mg tablet     Other Visit Diagnoses         Codes    Laryngopharyngeal reflux (LPR)     K21.9    Relevant Medications    omeprazole (PriLOSEC) 20 mg DR capsule

## 2024-10-30 ENCOUNTER — TELEPHONE (OUTPATIENT)
Dept: DERMATOLOGY | Facility: CLINIC | Age: 39
End: 2024-10-30
Payer: COMMERCIAL

## 2024-10-30 NOTE — TELEPHONE ENCOUNTER
Patient called and LM that she is having a painful breakout and would like a sooner appointment. Patient last seen 4/2021 by Lucy Roa and then 6/2021 by Dr.Gordon Reeder. Patient lives in [place] and is requesting a sooner appointment I believe that [place] is closer for the patient. May I have someone contact patient and offer next available with any provider in [place]?

## 2024-11-26 ENCOUNTER — APPOINTMENT (OUTPATIENT)
Dept: DERMATOLOGY | Facility: CLINIC | Age: 39
End: 2024-11-26
Payer: COMMERCIAL

## 2024-12-06 DIAGNOSIS — K21.9 LARYNGOPHARYNGEAL REFLUX (LPR): ICD-10-CM

## 2024-12-18 ENCOUNTER — APPOINTMENT (OUTPATIENT)
Dept: NEUROLOGY | Facility: CLINIC | Age: 39
End: 2024-12-18
Payer: COMMERCIAL

## 2024-12-19 RX ORDER — OMEPRAZOLE 20 MG/1
40 CAPSULE, DELAYED RELEASE ORAL DAILY
Qty: 180 CAPSULE | Refills: 0 | Status: SHIPPED | OUTPATIENT
Start: 2024-12-19 | End: 2025-12-19

## 2024-12-31 ENCOUNTER — APPOINTMENT (OUTPATIENT)
Dept: NEUROLOGY | Facility: CLINIC | Age: 39
End: 2024-12-31
Payer: COMMERCIAL

## 2024-12-31 VITALS
SYSTOLIC BLOOD PRESSURE: 111 MMHG | HEIGHT: 62 IN | HEART RATE: 82 BPM | DIASTOLIC BLOOD PRESSURE: 76 MMHG | BODY MASS INDEX: 50.19 KG/M2

## 2024-12-31 DIAGNOSIS — R41.9 COGNITIVE COMPLAINTS: ICD-10-CM

## 2024-12-31 DIAGNOSIS — G93.5 CHIARI I MALFORMATION (MULTI): Primary | ICD-10-CM

## 2024-12-31 PROCEDURE — 1036F TOBACCO NON-USER: CPT | Performed by: PSYCHIATRY & NEUROLOGY

## 2024-12-31 PROCEDURE — 99214 OFFICE O/P EST MOD 30 MIN: CPT | Performed by: PSYCHIATRY & NEUROLOGY

## 2024-12-31 NOTE — PROGRESS NOTES
Armida Smith is a 39 y.o. year old female seen in follow-up for 5 mm Chiari I malformation, cognitive concerns.    HPI    39-year-old right-handed woman with past medical history significant for chronic low back pain associated with left sciatica, IBS, anxiety and depression, pancreatic insufficiency, obesity, cholecystectomy, TMJ.     I evaluated her initially on 9/30/2022.  She presented primarily because of concern over radiologic findings. She had seen an ENT about 2 years previous because of sinus issues and underwent a sinus CT which incidentally noted cerebral calcifications. This was confirmed on subsequent dedicated head CT.      I reviewed a noncontrast head CT from 12/16/2020 showing symmetric calcification in the basal ganglia and in the retrosplenial regions as well as a small focus of calcification of the anterior interhemispheric falx and small anterior calvarial calcifications.     She was unaware as to any history of congenital (TORCH) infection or whether any family members had been found to have cerebral calcifications. She denies history of seizures.     She was subsequently sent for a brain MRI, done outside the  system in [facility] on 3/3/2022 and I reviewed the images and report. The MRI showed asymmetry of the lateral ventricles, right larger than left, without midline shift. Pristine cerebral white matter on T2 and FLAIR sequences. Tonsillar ectopia which I measured at roughly 4-5 mm but measured by radiology at 5.7 mm. No brainstem compression or stigmata of hydrocephalus. Calcifications were more evident on the CT than on the MRI.     In terms of neurologic symptoms she indicated a long history of predominantly left-sided low back pain and associated left sciatica, for years. Also over the past year episodically she had noted paresthesias that feel like cold water trickling over the left popliteal fossa and adjacent distal posterior thigh and proximal calf regions.       I reviewed lumbar spine x-rays from 7/28/2022, showing preserved sagittal alignment, preserved disc space heights, mild facet degenerative changes at lower lumbar levels.     I reviewed a likely idiopathic basis of cerebral calcifications in her case, and that they appeared to be asymptomatic, but I did recommend checking serum parathyroid hormone.  I reviewed borderline criteria for Chiari I malformation and the fact that this contraindicated lumbar puncture.  I recommended seeing neuro-ophthalmology regarding a complaint of OD visual disturbance.  I recommended checking vitamin B12 level with regard to lower extremity paresthesias.     Vitamin B12 level and parathyroid hormone came back normal.    I evaluated her subsequently and most recently on 3/1/2024.  Her complaint of left lower extremity paresthesia had nearly resolved.  She endorsed no interval headaches nor episodes of vertigo.  She endorsed absentmindedness and on questioning endorsed nonrestorative sleep and some daytime sleepiness.  I advised talking with her PCP about possibility of obstructive sleep apnea.    She is evaluated again today in the office.    She is between primary doctors at this point and has not yet discussed a polysomnogram.  She does recall undergoing a home sleep apnea test during the early part of the COVID pandemic which was apparently unrevealing.  She has not ever actually undergone a formal polysomnogram.    She continues to endorse nonrestorative overnight sleep.  She indicates that she does not feel well rested throughout the course of the day and could easily take a nap if given an opportunity.  That said, she endorses no issues with driving.  She does endorse some early morning awakening and has been started on sertraline for depression and anxiety which she is tolerating well.    She remains headache free.    At times she feels mildly off balance but does not specifically endorse vertigo or presyncopal  lightheadedness.  She is on vitamin B12 replacement.    From a cognitive standpoint she continues to note minor issues.  In particular, when she is typing an email she sometimes omits words or says something other than precisely what she intended.  She also indicates that she continues to note occasional episodes in which she is staring, yet she is aware she is doing so.    She does endorse a number of life stressors recently.  She bought a house.  Her father had a stroke and is currently recuperating.       Review of Systems    As per the history of present illness    Patient Active Problem List   Diagnosis    Diarrhea    Abnormal CT scan, sinus    Anxiety and depression    Arthropathy of left temporomandibular joint    Atypical chest pain    Bilateral edema of lower extremity    Brain tumor (Multi)    Calcification of brain    Chiari I malformation (Multi)    Chronic anemia    Ulcerative colitis    Erythema nodosum    Exocrine pancreatic insufficiency ([facility]CC)    Hematochezia    Hidradenitis suppurativa    Hyperglycemia    Hyperlipidemia, mild    Leukocytosis    Pseudotumor cerebri    Raised intracranial pressure    Vitamin D deficiency    Other fatigue    Healthcare maintenance    Breast cancer screening, high risk patient     Past Medical History:   Diagnosis Date    Low back pain, unspecified 07/27/2022    Chronic left-sided low back pain without sciatica    Personal history of other diseases of the digestive system     History of proctitis    Personal history of other infectious and parasitic diseases     History of gonorrhea of rectum    Ulcerative colitis, unspecified, without complications (Multi) 10/20/2022    Ulcerative colitis     Past Surgical History:   Procedure Laterality Date    BREAST CYST ASPIRATION Left     Axilla    OTHER SURGICAL HISTORY  02/05/2019    [place] tooth extraction    OTHER SURGICAL HISTORY  02/05/2019    Gallbladder surgery    OTHER SURGICAL HISTORY  06/22/2020    Colonoscopy     OTHER SURGICAL HISTORY  06/22/2020    Esophagogastroduodenoscopy     Social History     Tobacco Use    Smoking status: Never     Passive exposure: Never    Smokeless tobacco: Never   Substance Use Topics    Alcohol use: Not Currently     family history includes Breast cancer (age of onset: 25) in her mother's sister; Breast cancer (age of onset: 35) in her mother; Lung cancer (age of onset: 60 - 69) in her father's sister; No Known Problems in her father; Ovarian cancer (age of onset: 40 - 49) in an other family member; Testicular cancer (age of onset: 50) in her maternal grandfather.    Current Outpatient Medications:     albuterol 90 mcg/actuation inhaler, Inhale 2 puffs every 6 hours if needed., Disp: , Rfl:     benzonatate (Tessalon) 100 mg capsule, Take 1 capsule (100 mg) by mouth 3 times a day as needed., Disp: , Rfl:     biotin 1 mg capsule, Take 1 capsule (1 mg) by mouth once daily., Disp: , Rfl:     cholecalciferol (Vitamin D-3) 125 MCG (5000 UT) capsule, Take 1 capsule (125 mcg) by mouth once daily., Disp: , Rfl:     cyanocobalamin, vitamin B-12, (Vitamin B-12) 1,000 mcg tablet extended release, Take 1 tablet (1,000 mcg) by mouth once daily., Disp: , Rfl:     fluticasone (Flonase) 50 mcg/actuation nasal spray, USE 1 TO 2 SPRAYS INTO EACH NOSTRIL ONCE DAILY, Disp: 16 mL, Rfl: 2    hydrocortisone 2.5 % cream, Apply topically 2 times a day as needed for irritation or rash for up to 7 days., Disp: 30 g, Rfl: 0    iron, carbonyl 25 mg iron tablet, Take 1 tablet by mouth once daily., Disp: , Rfl:     mesalamine ER (Apriso) 0.375 gram 24 hr capsule, Take 4 capsules (1.5 g) by mouth once daily., Disp: , Rfl:     montelukast (Singulair) 10 mg tablet, TAKE 1 TABLET (10 MG) BY MOUTH ONCE DAILY AT BEDTIME., Disp: 90 tablet, Rfl: 1    omeprazole (PriLOSEC) 20 mg DR capsule, TAKE 2 CAPSULES (40 MG) BY MOUTH ONCE DAILY. DO NOT CRUSH OR CHEW., Disp: 180 capsule, Rfl: 0    pyridoxine (Vitamin B-6) 50 mg tablet, Take 1  tablet (50 mg) by mouth once daily., Disp: , Rfl:     sertraline (Zoloft) 50 mg tablet, Take 1 tablet (50 mg) by mouth once daily., Disp: 90 tablet, Rfl: 1  Allergies   Allergen Reactions    Ceftibuten Other     nausea    Cefuroxime Axetil Diarrhea and Nausea Only    Nsaids (Non-Steroidal Anti-Inflammatory Drug) Other     can cause bleeding, pt has UC       Objective   Neurological Exam  Physical Exam    Physical Examination:    General: Alert woman who was ambulatory without assistive devices.  Masked.    Mental Status: Clear sensorium without fluctuation.  Appropriate in conversation.  Fluent unremarkable speech without paraphasic errors or hesitancy.  Oriented to self, date except giving the day of the week as Wednesday rather than Tuesday, [place], gave the suburb as [place] rather than [place] (of note, she lives in [place]).  Oriented to the street and floor of the building.  She registered 3 and after considerable distraction recalled 3 without cueing.  Only 1/5 serial sevens with some historical difficulty with math, but 5/5 spelling world backward albeit slowly and laboriously.  Able to name current president, not first president.  Aware of current events.    Cranial Nerves: Exam was conducted with patient masked.  Funduscopic exam was  not well visualized bilaterally on nondilated exam.  Pupils were equal, round and reactive to light with no relative afferent pupillary defect.  Extraocular movements were intact and conjugate without nystagmus.  No ptosis.  Visual fields were full to confrontation tested binocularly.  Forehead and periocular facial motor function was symmetrically intact, but perioral facial motor function was not assessed due to mask.  Hearing was grossly intact.  No dysarthria.  Shoulder shrug was symmetric.  Tongue protrusion was not assessed due to mask.    Motor: There was no pronator drift or asymmetry of finger taps.     Coordination: There was no postural or rest tremor,  myoclonus or dystonic posturing.    Sensation: Romberg sign was absent.     Station: Intact and stable.    Gait: Stable and unremarkable.  Intact tandem.      Assessment/Plan     She has a borderline Chiari I malformation but no associated symptoms such as headaches or vertigo.    We again discussed her cognitive concerns.  I continue to get the impression that sleep quality, and probably a component of sleep apnea, is a major contributing factor.  As such I advised her to discuss with her new PCP (once established) about potentially undergoing a polysomnogram or at least another home sleep apnea test.  A polysomnogram would be more accurate.    As above she also brought up a number of recent life stressors, which we discussed could also play a role in producing a distracted mental state.    She indicates no issues with driving.    I recommended she see general neurology again in 1 year for reassessment.

## 2024-12-31 NOTE — PATIENT INSTRUCTIONS
We discussed again today that I think it is likely your sleep quality plays a role in your daytime cognitive function.  We discussed that there are some features in your history raising suspicion for the possibility of sleep apnea, including nonrestorative sleep (still feeling tired on getting up in the morning) and daytime sleepiness.    Please discuss with your next PCP about potentially setting up a polysomnogram or at least a home sleep apnea test to look into this possibility.    Your neurologic exam appears stable today.    It would be reasonable to see general neurology again in 1 year for reassessment, sooner if you have new concerns.

## 2025-01-22 ENCOUNTER — TELEMEDICINE (OUTPATIENT)
Dept: INTEGRATIVE MEDICINE | Facility: CLINIC | Age: 40
End: 2025-01-22
Payer: COMMERCIAL

## 2025-01-22 DIAGNOSIS — R53.83 OTHER FATIGUE: Primary | ICD-10-CM

## 2025-01-22 DIAGNOSIS — E55.9 VITAMIN D DEFICIENCY: ICD-10-CM

## 2025-01-22 DIAGNOSIS — D64.9 CHRONIC ANEMIA: ICD-10-CM

## 2025-01-22 DIAGNOSIS — D50.0 IRON DEFICIENCY ANEMIA DUE TO CHRONIC BLOOD LOSS: ICD-10-CM

## 2025-01-22 DIAGNOSIS — G47.19 EXCESSIVE DAYTIME SLEEPINESS: ICD-10-CM

## 2025-01-22 PROCEDURE — 99214 OFFICE O/P EST MOD 30 MIN: CPT | Performed by: INTERNAL MEDICINE

## 2025-01-22 ASSESSMENT — ENCOUNTER SYMPTOMS
ARTHRALGIAS: 1
DIARRHEA: 1
SLEEP DISTURBANCE: 1
FATIGUE: 1

## 2025-01-22 NOTE — PROGRESS NOTES
Integrative Medicine Follow-up Visit :     Subjective   Patient ID: Radha Smith is a 40 y.o. female who presents for chronic fatigue       HPI  Still having issues with fatigue. Went to see neurology and was told to get a sleep apnea test. Neurology recommends in hospital polysomnogram. Continues to have non restorative sleep. Has a lot of sleepiness during the day. Tries to get a nap done. Lots of brain fog. Slept 13 hours on Sunday and felt so tired even after sleeping.   She has anemia. She is on iron. If it gets worse was told to have an iron infusion. Pcp left system and no follow up. Wanting sleep study ordered because neurology would also not order the test. Unclear why.      Lab Results   Component Value Date    WBC 14.5 (H) 09/24/2024    HGB 10.9 (L) 09/24/2024    HCT 34.9 (L) 09/24/2024    MCV 82 09/24/2024     09/24/2024      Lab Results   Component Value Date    IRON 53 09/24/2024    TIBC 316 09/24/2024    FERRITIN 71 09/24/2024      Doing terrible with diet and exercise.    Does not have a pcp.   Tired all the time. Lots of stress.     Review of Systems   Constitutional:  Positive for fatigue.   Gastrointestinal:  Positive for diarrhea.   Genitourinary:  Positive for menstrual problem (very heavy periods. two this month.).   Musculoskeletal:  Positive for arthralgias.   Psychiatric/Behavioral:  Positive for sleep disturbance. Negative for suicidal ideas.                   Objective   There were no vitals taken for this visit.    Physical Exam  Constitutional:       General: She is not in acute distress.     Appearance: She is not ill-appearing, toxic-appearing or diaphoretic.   HENT:      Head: Normocephalic and atraumatic.      Mouth/Throat:      Mouth: Mucous membranes are moist.   Cardiovascular:      Rate and Rhythm: Normal rate.   Pulmonary:      Effort: Pulmonary effort is normal. No respiratory distress.   Musculoskeletal:         General: No swelling or deformity.      Cervical back:  Normal range of motion.      Comments: Upper extremities normal range of motion grossly   Skin:     General: Skin is dry.      Coloration: Skin is not jaundiced or pale.      Findings: No rash.   Neurological:      General: No focal deficit present.      Mental Status: She is alert and oriented to person, place, and time.   Psychiatric:         Mood and Affect: Mood normal.         Behavior: Behavior normal.      Comments: Normal affect                      Assessment/Plan     Problem List Items Addressed This Visit             ICD-10-CM    Chronic anemia D64.9     Continue care with GI. Unclear if she is doing this. She seems to have had fragmented care unfortunately due to moves and job changes and stress.          Vitamin D deficiency E55.9    Relevant Orders    Vitamin D 25-Hydroxy,Total (for eval of Vitamin D levels)    Other fatigue - Primary R53.83    Relevant Orders    In-Center Sleep Study (Non-Sleep Provider Only)    Vitamin B12    Excessive daytime sleepiness G47.19    Relevant Orders    In-Center Sleep Study (Non-Sleep Provider Only)    BMI 50.0-59.9, adult (Multi) Z68.43    Relevant Orders    In-Center Sleep Study (Non-Sleep Provider Only)    Iron deficiency anemia due to chronic blood loss D50.0    Relevant Orders    CBC and Auto Differential    Iron and TIBC    Ferritin     Decrease iron supplement to 1 tablet every other day for better absorption. Dose should be at least 65 mg  of iron every other day.   Get your sleep study.   Establish with a new primary care.   Consider Georgina Martinez MD for primary care in [place].   Consider Telma Nathan MD for primary care in [place]. Also Sondra Hope MD.  Get your labs.   See me in a couple months to see where you are at and what we need to do next.   Continue care with GI for her inflammatory bowel disease (ulcerative colitis).   Recommend Follow up in : 2 months.     Jennifer Melgar MD PhD    Time Spent  Prep time on day of patient  encounter: 3 minutes  Time spent directly with patient, family or caregiver: 20 minutes  Additional Time Spent on Patient Care Activities: 0 minutes  Documentation Time: 5 minutes  Other Time Spent: 0 minutes  Total: 28 minutes

## 2025-01-22 NOTE — ASSESSMENT & PLAN NOTE
Continue care with GI. Unclear if she is doing this. She seems to have had fragmented care unfortunately due to moves and job changes and stress.

## 2025-01-22 NOTE — PATIENT INSTRUCTIONS
Decrease iron supplement to 1 tablet every other day. Dose should be at least 65 mg  of iron.   Get your sleep study.   Establish with a new primary care.   Consider Georgina Martinez MD for primary care in [place].   Consider Telma Nathan MD for primary care in [place]. Also Sondra Hope MD.  Get your labs.   See me in a couple months to see where you are at and what we need to do next.   Jennifer Melgar MD PhD

## 2025-01-25 ENCOUNTER — LAB (OUTPATIENT)
Dept: LAB | Facility: LAB | Age: 40
End: 2025-01-25
Payer: COMMERCIAL

## 2025-01-25 DIAGNOSIS — R53.83 OTHER FATIGUE: ICD-10-CM

## 2025-01-25 DIAGNOSIS — E55.9 VITAMIN D DEFICIENCY: ICD-10-CM

## 2025-01-25 DIAGNOSIS — D50.0 IRON DEFICIENCY ANEMIA DUE TO CHRONIC BLOOD LOSS: ICD-10-CM

## 2025-01-25 PROCEDURE — 83540 ASSAY OF IRON: CPT

## 2025-01-25 PROCEDURE — 82306 VITAMIN D 25 HYDROXY: CPT

## 2025-01-25 PROCEDURE — 83550 IRON BINDING TEST: CPT

## 2025-01-25 PROCEDURE — 82607 VITAMIN B-12: CPT

## 2025-01-25 PROCEDURE — 85025 COMPLETE CBC W/AUTO DIFF WBC: CPT

## 2025-01-25 PROCEDURE — 82728 ASSAY OF FERRITIN: CPT

## 2025-01-26 LAB
25(OH)D3 SERPL-MCNC: 74 NG/ML (ref 30–100)
BASOPHILS # BLD AUTO: 0.03 X10*3/UL (ref 0–0.1)
BASOPHILS NFR BLD AUTO: 0.3 %
EOSINOPHIL # BLD AUTO: 0.09 X10*3/UL (ref 0–0.7)
EOSINOPHIL NFR BLD AUTO: 0.8 %
ERYTHROCYTE [DISTWIDTH] IN BLOOD BY AUTOMATED COUNT: 13.8 % (ref 11.5–14.5)
FERRITIN SERPL-MCNC: 80 NG/ML (ref 8–150)
HCT VFR BLD AUTO: 35.4 % (ref 36–46)
HGB BLD-MCNC: 11.3 G/DL (ref 12–16)
IMM GRANULOCYTES # BLD AUTO: 0.02 X10*3/UL (ref 0–0.7)
IMM GRANULOCYTES NFR BLD AUTO: 0.2 % (ref 0–0.9)
IRON SATN MFR SERPL: 14 % (ref 25–45)
IRON SERPL-MCNC: 44 UG/DL (ref 35–150)
LYMPHOCYTES # BLD AUTO: 3.2 X10*3/UL (ref 1.2–4.8)
LYMPHOCYTES NFR BLD AUTO: 28.1 %
MCH RBC QN AUTO: 25.7 PG (ref 26–34)
MCHC RBC AUTO-ENTMCNC: 31.9 G/DL (ref 32–36)
MCV RBC AUTO: 81 FL (ref 80–100)
MONOCYTES # BLD AUTO: 0.62 X10*3/UL (ref 0.1–1)
MONOCYTES NFR BLD AUTO: 5.5 %
NEUTROPHILS # BLD AUTO: 7.41 X10*3/UL (ref 1.2–7.7)
NEUTROPHILS NFR BLD AUTO: 65.1 %
NRBC BLD-RTO: 0 /100 WBCS (ref 0–0)
PLATELET # BLD AUTO: 355 X10*3/UL (ref 150–450)
RBC # BLD AUTO: 4.39 X10*6/UL (ref 4–5.2)
TIBC SERPL-MCNC: 308 UG/DL (ref 240–445)
UIBC SERPL-MCNC: 264 UG/DL (ref 110–370)
VIT B12 SERPL-MCNC: 837 PG/ML (ref 211–911)
WBC # BLD AUTO: 11.4 X10*3/UL (ref 4.4–11.3)

## 2025-01-27 ENCOUNTER — TELEPHONE (OUTPATIENT)
Dept: INTEGRATIVE MEDICINE | Facility: CLINIC | Age: 40
End: 2025-01-27
Payer: COMMERCIAL

## 2025-01-27 ENCOUNTER — HOSPITAL ENCOUNTER (EMERGENCY)
Age: 40
Discharge: HOME | End: 2025-01-27
Attending: STUDENT IN AN ORGANIZED HEALTH CARE EDUCATION/TRAINING PROGRAM
Payer: COMMERCIAL

## 2025-01-27 ENCOUNTER — APPOINTMENT (OUTPATIENT)
Dept: GENERAL RADIOLOGY | Age: 40
End: 2025-01-27
Payer: COMMERCIAL

## 2025-01-27 ENCOUNTER — TELEPHONE (OUTPATIENT)
Dept: INTERNAL MEDICINE | Facility: HOSPITAL | Age: 40
End: 2025-01-27
Payer: COMMERCIAL

## 2025-01-27 ENCOUNTER — APPOINTMENT (OUTPATIENT)
Dept: CT IMAGING | Age: 40
End: 2025-01-27
Payer: COMMERCIAL

## 2025-01-27 VITALS
SYSTOLIC BLOOD PRESSURE: 135 MMHG | BODY MASS INDEX: 52.11 KG/M2 | HEART RATE: 75 BPM | HEIGHT: 61 IN | OXYGEN SATURATION: 99 % | WEIGHT: 276 LBS | TEMPERATURE: 98.4 F | RESPIRATION RATE: 24 BRPM | DIASTOLIC BLOOD PRESSURE: 98 MMHG

## 2025-01-27 DIAGNOSIS — Z87.19 HISTORY OF ULCERATIVE COLITIS: ICD-10-CM

## 2025-01-27 DIAGNOSIS — R07.89 ATYPICAL CHEST PAIN: Primary | ICD-10-CM

## 2025-01-27 LAB
ALBUMIN SERPL-MCNC: 4 G/DL (ref 3.5–5.2)
ALP SERPL-CCNC: 98 U/L (ref 35–104)
ALT SERPL-CCNC: 10 U/L (ref 0–32)
ANION GAP SERPL CALCULATED.3IONS-SCNC: 10 MMOL/L (ref 7–16)
AST SERPL-CCNC: 19 U/L (ref 0–31)
BASOPHILS # BLD: 0.04 K/UL (ref 0–0.2)
BASOPHILS NFR BLD: 0 % (ref 0–2)
BILIRUB SERPL-MCNC: 0.5 MG/DL (ref 0–1.2)
BNP SERPL-MCNC: 216 PG/ML (ref 0–125)
BUN SERPL-MCNC: 7 MG/DL (ref 6–20)
CALCIUM SERPL-MCNC: 9 MG/DL (ref 8.6–10.2)
CHLORIDE SERPL-SCNC: 101 MMOL/L (ref 98–107)
CO2 SERPL-SCNC: 27 MMOL/L (ref 22–29)
CREAT SERPL-MCNC: 0.5 MG/DL (ref 0.5–1)
D-DIMER QUANTITATIVE: 283 NG/ML DDU (ref 0–230)
EKG ATRIAL RATE: 59 BPM
EKG P AXIS: 5 DEGREES
EKG P-R INTERVAL: 168 MS
EKG Q-T INTERVAL: 408 MS
EKG QRS DURATION: 78 MS
EKG QTC CALCULATION (BAZETT): 403 MS
EKG R AXIS: 68 DEGREES
EKG T AXIS: 11 DEGREES
EKG VENTRICULAR RATE: 59 BPM
EOSINOPHIL # BLD: 0.12 K/UL (ref 0.05–0.5)
EOSINOPHILS RELATIVE PERCENT: 1 % (ref 0–6)
ERYTHROCYTE [DISTWIDTH] IN BLOOD BY AUTOMATED COUNT: 14.2 % (ref 11.5–15)
GFR, ESTIMATED: >90 ML/MIN/1.73M2
GLUCOSE SERPL-MCNC: 84 MG/DL (ref 74–99)
HCG, URINE, POC: NEGATIVE
HCT VFR BLD AUTO: 38.3 % (ref 34–48)
HGB BLD-MCNC: 11.7 G/DL (ref 11.5–15.5)
IMM GRANULOCYTES # BLD AUTO: 0.04 K/UL (ref 0–0.58)
IMM GRANULOCYTES NFR BLD: 0 % (ref 0–5)
LYMPHOCYTES NFR BLD: 3.87 K/UL (ref 1.5–4)
LYMPHOCYTES RELATIVE PERCENT: 31 % (ref 20–42)
Lab: NORMAL
MCH RBC QN AUTO: 25.3 PG (ref 26–35)
MCHC RBC AUTO-ENTMCNC: 30.5 G/DL (ref 32–34.5)
MCV RBC AUTO: 82.9 FL (ref 80–99.9)
MONOCYTES NFR BLD: 0.64 K/UL (ref 0.1–0.95)
MONOCYTES NFR BLD: 5 % (ref 2–12)
NEGATIVE QC PASS/FAIL: NORMAL
NEUTROPHILS NFR BLD: 62 % (ref 43–80)
NEUTS SEG NFR BLD: 7.8 K/UL (ref 1.8–7.3)
PLATELET # BLD AUTO: 358 K/UL (ref 130–450)
PMV BLD AUTO: 9.2 FL (ref 7–12)
POSITIVE QC PASS/FAIL: NORMAL
POTASSIUM SERPL-SCNC: 3.7 MMOL/L (ref 3.5–5)
PROT SERPL-MCNC: 8 G/DL (ref 6.4–8.3)
RBC # BLD AUTO: 4.62 M/UL (ref 3.5–5.5)
SODIUM SERPL-SCNC: 138 MMOL/L (ref 132–146)
TROPONIN I SERPL HS-MCNC: <6 NG/L (ref 0–9)
WBC OTHER # BLD: 12.5 K/UL (ref 4.5–11.5)

## 2025-01-27 PROCEDURE — 71275 CT ANGIOGRAPHY CHEST: CPT

## 2025-01-27 PROCEDURE — 93005 ELECTROCARDIOGRAM TRACING: CPT | Performed by: PHYSICIAN ASSISTANT

## 2025-01-27 PROCEDURE — 93010 ELECTROCARDIOGRAM REPORT: CPT | Performed by: INTERNAL MEDICINE

## 2025-01-27 PROCEDURE — 6360000004 HC RX CONTRAST MEDICATION: Performed by: RADIOLOGY

## 2025-01-27 PROCEDURE — 85025 COMPLETE CBC W/AUTO DIFF WBC: CPT

## 2025-01-27 PROCEDURE — 83880 ASSAY OF NATRIURETIC PEPTIDE: CPT

## 2025-01-27 PROCEDURE — 85379 FIBRIN DEGRADATION QUANT: CPT

## 2025-01-27 PROCEDURE — 80053 COMPREHEN METABOLIC PANEL: CPT

## 2025-01-27 PROCEDURE — 99285 EMERGENCY DEPT VISIT HI MDM: CPT

## 2025-01-27 PROCEDURE — 6370000000 HC RX 637 (ALT 250 FOR IP): Performed by: STUDENT IN AN ORGANIZED HEALTH CARE EDUCATION/TRAINING PROGRAM

## 2025-01-27 PROCEDURE — 71045 X-RAY EXAM CHEST 1 VIEW: CPT

## 2025-01-27 PROCEDURE — 84484 ASSAY OF TROPONIN QUANT: CPT

## 2025-01-27 RX ORDER — IOPAMIDOL 755 MG/ML
75 INJECTION, SOLUTION INTRAVASCULAR
Status: COMPLETED | OUTPATIENT
Start: 2025-01-27 | End: 2025-01-27

## 2025-01-27 RX ORDER — IPRATROPIUM BROMIDE AND ALBUTEROL SULFATE 2.5; .5 MG/3ML; MG/3ML
1 SOLUTION RESPIRATORY (INHALATION) ONCE
Status: COMPLETED | OUTPATIENT
Start: 2025-01-27 | End: 2025-01-27

## 2025-01-27 RX ORDER — KETOROLAC TROMETHAMINE 15 MG/ML
15 INJECTION, SOLUTION INTRAMUSCULAR; INTRAVENOUS ONCE
Status: DISCONTINUED | OUTPATIENT
Start: 2025-01-27 | End: 2025-01-27 | Stop reason: HOSPADM

## 2025-01-27 RX ADMIN — IOPAMIDOL 75 ML: 755 INJECTION, SOLUTION INTRAVENOUS at 16:19

## 2025-01-27 RX ADMIN — IPRATROPIUM BROMIDE AND ALBUTEROL SULFATE 1 DOSE: .5; 2.5 SOLUTION RESPIRATORY (INHALATION) at 13:30

## 2025-01-27 ASSESSMENT — ENCOUNTER SYMPTOMS
SORE THROAT: 0
SHORTNESS OF BREATH: 0
EYE REDNESS: 0
DIARRHEA: 0
EYE DISCHARGE: 0
VOMITING: 0
EYE PAIN: 0
SINUS PRESSURE: 0
CHEST TIGHTNESS: 1
BACK PAIN: 0
COUGH: 0
NAUSEA: 0
WHEEZING: 0
ABDOMINAL DISTENTION: 0

## 2025-01-27 ASSESSMENT — PAIN DESCRIPTION - LOCATION: LOCATION: CHEST

## 2025-01-27 ASSESSMENT — LIFESTYLE VARIABLES
HOW OFTEN DO YOU HAVE A DRINK CONTAINING ALCOHOL: NEVER
HOW MANY STANDARD DRINKS CONTAINING ALCOHOL DO YOU HAVE ON A TYPICAL DAY: PATIENT DOES NOT DRINK

## 2025-01-27 ASSESSMENT — PAIN SCALES - GENERAL: PAINLEVEL_OUTOF10: 6

## 2025-01-27 ASSESSMENT — PAIN DESCRIPTION - PAIN TYPE: TYPE: ACUTE PAIN

## 2025-01-27 ASSESSMENT — PAIN DESCRIPTION - DESCRIPTORS: DESCRIPTORS: DISCOMFORT

## 2025-01-27 ASSESSMENT — PAIN DESCRIPTION - ONSET: ONSET: ON-GOING

## 2025-01-27 ASSESSMENT — PAIN - FUNCTIONAL ASSESSMENT: PAIN_FUNCTIONAL_ASSESSMENT: 0-10

## 2025-01-27 ASSESSMENT — PAIN DESCRIPTION - FREQUENCY: FREQUENCY: CONTINUOUS

## 2025-01-27 NOTE — TELEPHONE ENCOUNTER
Patient received a call from office. Wanting to know if Dr. Melgar called regarding her bloodwork?

## 2025-01-27 NOTE — ED PROVIDER NOTES
Department of Emergency Medicine   ED  Provider Note  Admit Date/RoomTime: 1/27/2025 12:58 PM  ED Room: /32              [facility]     Millicent Keita is a 40 y.o. female with a PMHx significant for  asthma, ulcerative colitis, anemia  who presents for evaluation of chest tightness, pressure, beginning prior to arrival.  The complaint has been persistent, moderate in severity, and worsened by nothing.   The patient states that for the last week she has had a little bit of a runny nose, notes that when she wakes up in the a.m. she will see some blood-streaked mucus.  Denies any chest discomfort, but then this morning upon awakening around 0 700 she noticed a headache with tightness in her chest.  States that it feels like chest tightness more in the left with some pain versus tightness pressure going down the left arm.  Denies any associated numbness or tingling.  Notes that she recently was seen last week for lab work.     Review of Systems   Constitutional:  Negative for chills and fever.   HENT:  Positive for congestion. Negative for ear pain, sinus pressure and sore throat.    Eyes:  Negative for pain, discharge and redness.   Respiratory:  Positive for chest tightness. Negative for cough, shortness of breath and wheezing.    Cardiovascular:  Positive for chest pain.   Gastrointestinal:  Negative for abdominal distention, diarrhea, nausea and vomiting.   Genitourinary:  Negative for dysuria and frequency.   Musculoskeletal:  Negative for arthralgias and back pain.   Skin:  Negative for rash and wound.   Neurological:  Positive for headaches. Negative for weakness.   Hematological:  Negative for adenopathy.   All other systems reviewed and are negative.       Physical Exam  Vitals and nursing note reviewed.   Constitutional:       General: She is not in acute distress.     Appearance: Normal appearance. She is well-developed. She is not ill-appearing.   HENT:      Head: Normocephalic and atraumatic.      Right

## 2025-01-27 NOTE — DISCHARGE INSTRUCTIONS
CTA PULMONARY W CONTRAST   Final Result   No evidence of pulmonary embolism or other acute abnormality.         XR CHEST PORTABLE   Final Result   No acute process.           Follow up with your family physician  If symptoms worsen or concern arises return for re-evaluation.

## 2025-01-27 NOTE — ED TRIAGE NOTES
Department of Emergency Medicine    FIRST PROVIDER TRIAGE NOTE             Independent MLP           1/27/25  11:48 AM EST    Date of Encounter: 1/27/25   MRN: [id number]    Vitals:    01/27/25 1133 01/27/25 1135   BP:  (!) 135/98   Pulse: 75    Resp: 24    Temp: 98.4 °F (36.9 °C)    TempSrc: Oral    SpO2: 99%    Weight: 125.2 kg (276 lb)    Height: 1.549 m (5' 1\")         HPI: Millicent Keita is a 40 y.o. female who presents to the ED for Chest Pain (Chest tightness. Since this AM) and Hand Pain (Left hand pain)     ROS: Negative for sob or neck pain.    Physical Exam:   Gen Appearance/Constitutional: alert  CV: regular rate     Initial Plan of Care: All treatment areas with department are currently occupied.     Plan to order/Initiate the following while awaiting opening in ED: Triage evaluation  EKG.    Provider-Patient relationship only established for Provider In Triage (PIT).  Full assessment, HPI, and examination not performed, therefore, it is not yet possible to state whether or not an emergency medical condition exists.  This provider not responsible to follow or interpret any labs or testing ordered in triage.  Supervisor request for VALERIA to initiate contact and input an assessment note in triage during high volume surges.     Initial Plan of Care: Initiate Treatment-Testing, Proceed toTreatment Area When Bed Available for ED Attending/MLP to Continue Care  Secondary to high volume, low staffing, and/or boarding- patient to await bed availability.    This ends my PIT-Patient relationship.  Care of patient relinquished after triage    Electronically signed by Martha Crawford PA-C   DD: 1/27/25

## 2025-01-27 NOTE — TELEPHONE ENCOUNTER
Spoke to patient by phone about results.  Recommend she has still mild iron deficiency anemia. She has not seen gi in months and has inflammatory bowel disease. Recommend seeing them to discuss possible treatments. We also increase the amount of iron that she was taking on her own from 25 mg to 50 mg and she will take this every other day.,   B12 and vitamin d are good. Continue supplementation.   Pt questions answered.       Jennifer Melgar MD PhD

## 2025-01-29 ENCOUNTER — APPOINTMENT (OUTPATIENT)
Dept: PRIMARY CARE | Facility: CLINIC | Age: 40
End: 2025-01-29
Payer: COMMERCIAL

## 2025-01-29 ENCOUNTER — TELEPHONE (OUTPATIENT)
Dept: PRIMARY CARE | Facility: CLINIC | Age: 40
End: 2025-01-29

## 2025-01-29 NOTE — TELEPHONE ENCOUNTER
This patient would be new to you, was referred to you and wants to know if you would accept her as a new patient

## 2025-01-30 ENCOUNTER — DOCUMENTATION (OUTPATIENT)
Dept: PRIMARY CARE | Facility: CLINIC | Age: 40
End: 2025-01-30
Payer: COMMERCIAL

## 2025-02-03 ENCOUNTER — OFFICE VISIT (OUTPATIENT)
Dept: GASTROENTEROLOGY | Facility: CLINIC | Age: 40
End: 2025-02-03
Payer: COMMERCIAL

## 2025-02-03 ENCOUNTER — APPOINTMENT (OUTPATIENT)
Dept: OBSTETRICS AND GYNECOLOGY | Facility: CLINIC | Age: 40
End: 2025-02-03
Payer: COMMERCIAL

## 2025-02-03 ENCOUNTER — OFFICE VISIT (OUTPATIENT)
Dept: OBSTETRICS AND GYNECOLOGY | Facility: CLINIC | Age: 40
End: 2025-02-03
Payer: COMMERCIAL

## 2025-02-03 VITALS
DIASTOLIC BLOOD PRESSURE: 76 MMHG | BODY MASS INDEX: 51.53 KG/M2 | HEIGHT: 62 IN | SYSTOLIC BLOOD PRESSURE: 125 MMHG | WEIGHT: 280 LBS | TEMPERATURE: 98.4 F | HEART RATE: 99 BPM

## 2025-02-03 VITALS — HEART RATE: 75 BPM | DIASTOLIC BLOOD PRESSURE: 81 MMHG | SYSTOLIC BLOOD PRESSURE: 132 MMHG

## 2025-02-03 DIAGNOSIS — Z11.59 NEED FOR HEPATITIS C SCREENING TEST: ICD-10-CM

## 2025-02-03 DIAGNOSIS — E66.813 CLASS 3 SEVERE OBESITY WITH BODY MASS INDEX (BMI) OF 50.0 TO 59.9 IN ADULT, UNSPECIFIED OBESITY TYPE, UNSPECIFIED WHETHER SERIOUS COMORBIDITY PRESENT: ICD-10-CM

## 2025-02-03 DIAGNOSIS — D50.9 IRON DEFICIENCY ANEMIA, UNSPECIFIED IRON DEFICIENCY ANEMIA TYPE: ICD-10-CM

## 2025-02-03 DIAGNOSIS — E66.01 CLASS 3 SEVERE OBESITY WITH BODY MASS INDEX (BMI) OF 50.0 TO 59.9 IN ADULT, UNSPECIFIED OBESITY TYPE, UNSPECIFIED WHETHER SERIOUS COMORBIDITY PRESENT: ICD-10-CM

## 2025-02-03 DIAGNOSIS — K51.919 ULCERATIVE COLITIS WITH COMPLICATION, UNSPECIFIED LOCATION (MULTI): Primary | ICD-10-CM

## 2025-02-03 DIAGNOSIS — N92.6 IRREGULAR MENSES: Primary | ICD-10-CM

## 2025-02-03 DIAGNOSIS — N89.8 VAGINAL ODOR: ICD-10-CM

## 2025-02-03 DIAGNOSIS — R10.13 DYSPEPSIA: ICD-10-CM

## 2025-02-03 PROCEDURE — 99215 OFFICE O/P EST HI 40 MIN: CPT | Performed by: STUDENT IN AN ORGANIZED HEALTH CARE EDUCATION/TRAINING PROGRAM

## 2025-02-03 PROCEDURE — 99213 OFFICE O/P EST LOW 20 MIN: CPT | Performed by: NURSE PRACTITIONER

## 2025-02-03 PROCEDURE — 3008F BODY MASS INDEX DOCD: CPT | Performed by: STUDENT IN AN ORGANIZED HEALTH CARE EDUCATION/TRAINING PROGRAM

## 2025-02-03 RX ORDER — PANTOPRAZOLE SODIUM 40 MG/1
40 TABLET, DELAYED RELEASE ORAL
Qty: 30 TABLET | Refills: 2 | Status: SHIPPED | OUTPATIENT
Start: 2025-02-03 | End: 2025-05-04

## 2025-02-03 ASSESSMENT — ENCOUNTER SYMPTOMS
ALLERGIC/IMMUNOLOGIC NEGATIVE: 1
GASTROINTESTINAL NEGATIVE: 1
CARDIOVASCULAR NEGATIVE: 1
EYES NEGATIVE: 1
NEUROLOGICAL NEGATIVE: 1
MUSCULOSKELETAL NEGATIVE: 1
CONSTITUTIONAL NEGATIVE: 1
HEMATOLOGIC/LYMPHATIC NEGATIVE: 1
PSYCHIATRIC NEGATIVE: 1
RESPIRATORY NEGATIVE: 1
ENDOCRINE NEGATIVE: 1

## 2025-02-03 ASSESSMENT — PAIN SCALES - GENERAL: PAINLEVEL_OUTOF10: 4

## 2025-02-03 NOTE — PROGRESS NOTES
REASON FOR VISIT:  fu UC    HPI:  Radha Smith is a 40 y.o. female with a pmhx of anxiety/depression, morbid obesity (BMI 52), prior diagnosis of EPI, HLD, prior diagnosis of UC, Chiari I malformation, cholecystectomy, who presents for fu.      Summary:   --Prior pt of Ladonna Martinez. Documentation reports dx of UC dx in 2007 on Lialda, however had a colonoscopy around 2012 that was negative for colitis. In 2015 she was started on Elavil for IBS and empiric rifaximin for SIBO. No prior farooq for panc insufficiency. 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 IBD-[facility]GI in 2020 sent, not found in scanned reports.   --Treated with mesalamine and canasa, stopped two years ago. This was stopped by Dr. Ocasio per pt as it was not working. failed bentyl, creon (gaver her abdominal pain).  --Seen by GI 2/2022 (Nissa). Noted to have chronic normocytic anemia of unclear etiology, chronic leukocytosis, alternating constipation/diarrhea. She was not thought to have UC based on a negative colonoscopy 7/2020. ESR and CRP elevated at 37 and 1.54 respectively. Celiac panel negative. She was referred to heme/onc and CT-E was ordered to r/o small bowel CD (not obtained). Saw heme/onc in 2/2022--INESSA thought to be 2/2 to UC and mild leukocytosis thought to be reactive (2/2 IBD vs sinusitis). OTONIEL 2 mutation and BCR/ABL by FISH was negative in November 2020.  --Initial OV 7/2022 for rectal bleeding. CT a/p noncontrast 10/12/2022 unremarkable. Colon 10/2022 localized colitis in splenic flexure, ?Crohns' disease rather than UC given no activity seen in rectum. (full report below). Started on Apriso and planned for small bowel imaging. Panc elastase 12 7/2022, did not tolerate creon and planned for pancreatic imaging. MR 12/2022 normal. Unclear etiology, no hx of pancreatitis, chronic panc, DM, CF. Asx without steatorrhea nor pain.   --OV 1/2023. Recommended to continue Apriso and bloodwork. FU 6 months.   --OV 2/2023. Consistent noncompliance  "with Apriso, however her provided photographic documentation of averaged bristol stool scales and frequency of BMs was reassuring. Planned for continued mesalamine, bloodwork, and fu with pelvic floor PT for anal spasm. Seen by urogyn and diagnosed with high tone pelvic floor dysfunction. Continue PT.     Last seen 6/2023 for halitosis, esophagram negative for diverticulum. Noncompliant with UC therapy.    Seen by functional medicine 1/27/2025 and found to have mild INESSA, referred back to GI.     Today, she reports two BMs/day, bristol around 4-5, denies hematochezia, mucus. Endorses flatulence after BMs. +Endorses frequent belching, indigestion, and \"slowed digestion.\" Did endorse improvement with ppi trial in 2023. Eats lentils daily. Worried she may be incompletely evacuating due to frequent flatulence.      Last EGD 7 years ago in the [facility]. Eructation q3 minutes during interview. Unknown if checked for H pylori.     With regards to her UC, she has stopped taking her mesalamine in 2023. Reports a mental block against taking it. No flares of her UC since cessation. No abdominal pain. Main compliant is frequent root canals.     Labs 1/27/2025 with hgb of 11.7, normocytic. Ferritin 80, % sat 14. She reports two periods in the month of January but no menorrhagia. On iron supplementation every other day, will be increasing to double dose.      FCP in 2020 40. Fecal fat normal, panc elastase <15 in 2020, 12 in 2022. CMP, Vit D and B12 normal 1/2025. MR 2022 no acute abdominopelvic abnormality. No focal abnormality of the pancreatic parenchymal or evidence of pancreatic duct dilation.     No fhx of CRC, pancreatic, esophageal, or gastric cancer. No tobacco use, no alcohol use. No fhx of IBD. Denies any prior of history of chronic pancreatitis, cystic fibrosis.      Prev endoscopic eval:   Colon 10/20/22: - The examined portion of the ileum was normal. Biopsied.   - Localized minimal inflammation was found at the " splenic flexure. Biopsied.   - Anal papilla(e) were hypertrophied.   - Biopsies were taken with a cold forceps for histology in the rectum, in the sigmoid colon, in the descending colon, in the transverse colon, in the ascending colon and in the cecum.  Path:   FINAL DIAGNOSIS  A. TERMINAL ILEUM BIOPSY, COLD FORCEP:  -- MULTIPLE FRAGMENTS OF TERMINAL ILEAL MUCOSA WITH PRESERVED VILLOUS  ARCHITECTURE, SEE NOTE.     Note: No significant increase in intraepithelial lymphocytes.  B. CECUM AND ASCENDING BIOPSY, COLD FORCEP:  -- MULTIPLE FRAGMENTS OF COLONIC MUCOSA WITH NO SIGNIFICANT PATHOLOGICAL  FINDINGS, SEE NOTE.     Note: Negative for granulomas, viral inclusions and dysplasia.  C. TRANSVERSE COLON, COLD FORCEP:  -- MULTIPLE FRAGMENTS OF COLONIC MUCOSA WITH NO SIGNIFICANT PATHOLOGICAL  FINDINGS, SEE NOTE.     Note: Negative for granulomas, viral inclusions and dysplasia.  D. DESCENDING AND SIGMOID BIOPSY, COLD FORCEP:  -- MULTIPLE FRAGMENTS OF COLONIC MUCOSA WITH MINIMAL ARCHITECTURAL DISTORTION,  SEE NOTE.     Note: Negative for granulomas, viral inclusions and dysplasia.  E. SPLENIC FLEXURE BIOPSY, COLD FORCEP:  --ULCERATED COLONIC MUCOSA WITH CHRONIC ACTIVE COLITIS, SEE NOTE.     Note: Minimal cryptitis, in a background of chronic inflammation and focal  ulceration. Negative for granulomas, viral inclusions and dysplasia.  F. RECTUM BIOPSY, COLD FORCEP:  -- MULTIPLE FRAGMENTS OF COLONIC MUCOSA WITH NO SIGNIFICANT PATHOLOGICAL  FINDINGS.     Colon 7/2020: - The examined portion of the terminal ileum was normal.   - Mildly altered vascular mucosa in the descending colon.   - The colon and rectum examination was otherwise normal.   - Small non-bleeding external and internal hemorrhoids.   - Random biopsies were taken with a cold forceps for   histology in the rectum, in the sigmoid colon, in the   descending colon, in the transverse colon, in the right   colon and in the terminal ileum.  Path: A. TERMINAL ILEUM BX  (COLD FORCEP):  -- SMALL INTESTINAL MUCOSA WITH NO SIGNIFICANT PATHOLOGICAL FINDINGS.     B. RIGHT COLON BX (COLD FORCEP):  -- FRAGMENTS OF COLONIC MUCOSA WITH FOCAL ACUTE COLITIS. SEE NOTE.     Note: There is no crypt architectural distortion, granulomas, or dysplasia. This finding is nonspecific and possible causes include bowel preparation,  medications, acute infections, among others.     C. TRANSVERSE BX (COLD FORCEP):  -- COLONIC MUCOSA WITH NO SIGNIFICANT PATHOLOGICAL FINDINGS.     D. DESCENDING BX (COLD FORCEP):  -- COLONIC MUCOSA WITH NO SIGNIFICANT PATHOLOGICAL FINDINGS.     E. SIGMOID BX (COLD FORCEP):  -- COLONIC MUCOSA WITH NO SIGNIFICANT PATHOLOGICAL FINDINGS.     F. RECTUM BX (COLD FORCEP):  -- COLONIC MUCOSA WITH NO SIGNIFICANT PATHOLOGICAL FINDINGS.     Colon 2012: normal except for internal hemorrhoids    Reports EGD 7 years ago, unknown findings.      REVIEW OF SYSTEMS  CONSTITUTIONAL: Negative for fevers  HEENT: Negative for frequent/significant headaches  RESPIRATORY: Negative for hemoptysis  CARDIOVASCULAR: Negative for chest pain  GI: See HPI  : Negative for hematuria  MUSCULOSKELETAL: Negative for arthralgia or myalgia  INTEGUMENTARY/SKIN: Negative for rash or skin lesion  HEMATOLOGY/LYMPHOLOGY: Negative for prolonged bleeding  ENDOCRINE: Negative for cold/heat intolerance  NEURO: Negative for encephalopathy  PSYCH: Negative for new changes in mood or affect        Allergies   Allergen Reactions    Ceftibuten Other     nausea    Cefuroxime Axetil Diarrhea and Nausea Only    Nsaids (Non-Steroidal Anti-Inflammatory Drug) Other     can cause bleeding, pt has UC       Past Medical History:   Diagnosis Date    Low back pain, unspecified 07/27/2022    Chronic left-sided low back pain without sciatica    Personal history of other diseases of the digestive system     History of proctitis    Personal history of other infectious and parasitic diseases     History of gonorrhea of rectum    Ulcerative  colitis, unspecified, without complications (Multi) 10/20/2022    Ulcerative colitis       Past Surgical History:   Procedure Laterality Date    BREAST CYST ASPIRATION Left     Axilla    OTHER SURGICAL HISTORY  02/05/2019    Sacul tooth extraction    OTHER SURGICAL HISTORY  02/05/2019    Gallbladder surgery    OTHER SURGICAL HISTORY  06/22/2020    Colonoscopy    OTHER SURGICAL HISTORY  06/22/2020    Esophagogastroduodenoscopy       Family History   Problem Relation Name Age of Onset    Breast cancer Mother  35    No Known Problems Father      Breast cancer Mother's Sister  25    Lung cancer Father's Sister  60 - 69    Testicular cancer Maternal Grandfather  50    Ovarian cancer Other  40 - 49        maternal cousin       Social History     Tobacco Use    Smoking status: Never     Passive exposure: Never    Smokeless tobacco: Never   Substance Use Topics    Alcohol use: Not Currently       Current Outpatient Medications   Medication Sig Dispense Refill    albuterol 90 mcg/actuation inhaler Inhale 2 puffs every 6 hours if needed.      benzonatate (Tessalon) 100 mg capsule Take 1 capsule (100 mg) by mouth 3 times a day as needed.      biotin 1 mg capsule Take 1 capsule (1 mg) by mouth once daily.      cholecalciferol (Vitamin D-3) 125 MCG (5000 UT) capsule Take 1 capsule (125 mcg) by mouth once daily.      cyanocobalamin, vitamin B-12, (Vitamin B-12) 1,000 mcg tablet extended release Take 1 tablet (1,000 mcg) by mouth once daily.      fluticasone (Flonase) 50 mcg/actuation nasal spray USE 1 TO 2 SPRAYS INTO EACH NOSTRIL ONCE DAILY 16 mL 2    iron, carbonyl 25 mg iron tablet Take 1 tablet by mouth once daily.      montelukast (Singulair) 10 mg tablet TAKE 1 TABLET (10 MG) BY MOUTH ONCE DAILY AT BEDTIME. 90 tablet 1    pyridoxine (Vitamin B-6) 50 mg tablet Take 1 tablet (50 mg) by mouth once daily.      hydrocortisone 2.5 % cream Apply topically 2 times a day as needed for irritation or rash for up to 7 days. 30 g 0     "mesalamine ER (Apriso) 0.375 gram 24 hr capsule Take 4 capsules (1.5 g) by mouth once daily. (Patient not taking: Reported on 2/3/2025)      sertraline (Zoloft) 50 mg tablet Take 1 tablet (50 mg) by mouth once daily. 90 tablet 1     No current facility-administered medications for this visit.     PHYSICAL EXAM:  /76   Pulse 99   Temp 36.9 °C (98.4 °F)   Ht 1.562 m (5' 1.5\")   Wt 127 kg (280 lb)   BMI 52.05 kg/m²    Gen: aaox3, NAD  Head: Normocephalic, atraumatic  Eyes: Sclera anicteric, conjunctiva pink   Neck: Supple  Heart: RRR, no murmurs, rubs or gallops  Lungs: CTAB, non-labored breathing  Abd: Soft, non-distended, non-tender, bowel sounds present, no rebound or guarding, no organomegaly  Ext: No LE edema b/l  Neuro: no gross focal deficits  Psych: Congruent mood and affect, appropriate insight and judgement  Skin: No jaundice    Lab Results   Component Value Date    ALT 11 09/24/2024    AST 14 09/24/2024    ALKPHOS 75 09/24/2024    BILITOT 0.7 09/24/2024       === 01/05/23 ===    RAD OUTSIDE EXAM OVER READ    - Impression -  No acute abdominopelvic abnormality. No focal abnormality of the  pancreatic parenchymal or evidence of pancreatic duct dilation.    I personally reviewed the images/study and I agree with the findings  as stated. This study was interpreted at [facility], [place], [place].      ASSESSMENT  INESSA, mild (hgb 11.7, ferritin 80)  Dyspepsia  Prior dx of UC, histologic normal colon 2020, localized colitis in 2022, noncompliant with mesalamine   EPI  Anal spasms/high tone pelvic floor dysfunction   Obesity (BMI 52)  Need for HCV screening    Long-standing history of mild INESSA dating back to 2019, ferritin usually normal however % sat low. This could certainly be due to IBD, however colon in 2020 normal and colonoscopy in 2022 with mild localized disease only. No menorrhagia to explain INESSA, last EGD 7 years ago per pt also unremarkable. Given that she is " off her UC therapy x 2 years, will need to rule out ongoing inflammation. She remains asymptomatic with BMs at baseline.     Frequent eructation/flatulence and dyspepsia possibly dietary vs SIBO vs GERD (improvement with ppi in past). H. Pylori status unknown, unclear if checked during prior EGD.     PLAN  --SIBO  --ppi trial  --if SIBO testing negative, plan for EGD with HP biopsies  --if EGD deferred, noninvasive testing for HP  --check FCP  --If FCP elevated, restart therapy and plan for colonoscopy. Can be done with EGD if needed  --increase iron supplementation as prescribed  --repeat iron labs in 3 months  --avoid lentils/beans/garlic x 1 week, assess for improvement in sx  --gas-x prn  --has weight loss plan, however interested in bariatric surgery if not successful. Will readdress at fu.   --HCV  --recall colon 2027 if not done earlier for disease assessment    FU 3 months    Signature: Sylvia Wagoner MD    Date: 2/3/2025  Time: 8:07 AM

## 2025-02-03 NOTE — PATIENT INSTRUCTIONS
Thank you for seeing us in clinic today!     In summary, please do the following:  Please get your stool studies at your earliest convenience.   2.   Please get your bloodwork in 3 months after your increased iron supplementation.   3. We will do a breath test to look for bacterial overgrowth.   4.  Restart your pantoprazole 1 hour before breakfast daily.   5. Avoid all lentils, beans, onions, and garlic for a week and see if this helps your symptoms.     We will see you again in 3 months or sooner if needed.   If you have any questions or concerns, please call the office at [phone].

## 2025-02-03 NOTE — PROGRESS NOTES
Subjective   Patient ID: Radha Smith is a 40 y.o. female who presents for evaluation of irregular bleeding.     Reports experiencing two menstrual periods in January, which is unusual for her typical cycle. Currently not on any hormonal medications, and has not ever been sexually active in the past. Mentions she has not taken Prednisone for a while. Reports her last pap was approximately in 3579-7928.    Reports she continues to experience some pelvic floor tenderness and spasms, which have improved with physical therapy. Reports a previous vaginal injection helped alleviate the pain.     Reports a worsening odor in the vaginal area over the last few months.  Odor is not associated with her menstrual cycle and is described as terrible but not fishy or iron-like. She has gained weight recently, which she suspects might be contributing to the issue.    Mentions a FHx of cancer, with her mother having breast cancer at 35, her aunt had breast cancer at 20, and a cousin had ovarian cancer. She is concerned about the potential for early menopause but is unsure of her mother's menopause history due to her passing.      Review of Systems   Constitutional: Negative.    HENT: Negative.     Eyes: Negative.    Respiratory: Negative.     Cardiovascular: Negative.    Gastrointestinal: Negative.    Endocrine: Negative.    Genitourinary:  Positive for menstrual problem.   Musculoskeletal: Negative.    Skin: Negative.    Allergic/Immunologic: Negative.    Neurological: Negative.    Hematological: Negative.    Psychiatric/Behavioral: Negative.         Objective   Physical Exam  Constitutional:       Appearance: Normal appearance. She is obese.   HENT:      Head: Normocephalic and atraumatic.   Genitourinary:     General: Normal vulva.      Exam position: Lithotomy position.      Vagina: Vaginal discharge present. No tenderness or bleeding.      Cervix: Normal.      Uterus: Normal. Not fixed.       Adnexa: Right adnexa normal and  left adnexa normal.      Comments: Presence of copious, white, thick discharge. No odor detected during exam.   Neurological:      General: No focal deficit present.      Mental Status: She is alert and oriented to person, place, and time.   Psychiatric:         Mood and Affect: Mood normal.         Behavior: Behavior normal.         Thought Content: Thought content normal.         Judgment: Judgment normal.       Assessment/Plan   40 y.o. female with vaginal odor presenting for well woman exam. Comorbidities include: HLD, Hyperglycemia, Chronic anemia, Edema.     Diagnosis List:  #1 Irregular bleeding   #2 Vaginal odor    Plan:  1. Irregular bleeding   - Discussed Pap smear exam intervals of every 3-5 years. Plan for future Pap smear ensuring it does not coincide with heavy menstrual bleeding.   - Advised to continue tracking her menstrual cycle, consider an US if irregular bleeding persists to evaluate for uterine polyps or fibroids.     2. Vaginal odor  - Vaginal swab obtained to rule out BV or yeast infection; awaiting results.       Follow up as scheduled in June with HARRISON Mabry for Pap smear.      Scribe Attestation  By signing my name below, IAlissa Scribe, attest that this documentation has been prepared under the direction and in the presence of HARRISON Mabry on 02/03/2025 at 9:17 PM.   I, HARRISON Mabry, personally performed the services described in this documentation which was scribed virtually and I confirm that it is both accurate and complete.     HARRISON Mabry 02/03/25 12:59 PM

## 2025-02-04 LAB — BV SCORE VAG QL: NORMAL

## 2025-02-07 ENCOUNTER — TELEPHONE (OUTPATIENT)
Dept: OBSTETRICS AND GYNECOLOGY | Facility: CLINIC | Age: 40
End: 2025-02-07
Payer: COMMERCIAL

## 2025-02-08 LAB — CALPROTECTIN STL-MCNT: 117 MCG/G

## 2025-02-28 ENCOUNTER — TELEPHONE (OUTPATIENT)
Dept: OTOLARYNGOLOGY | Facility: HOSPITAL | Age: 40
End: 2025-02-28
Payer: COMMERCIAL

## 2025-02-28 NOTE — TELEPHONE ENCOUNTER
returned patients message requesting to schedule a follow up with Adelina. Advised of soonest available appts and to conact the appt line or office to schedule.

## 2025-03-06 ENCOUNTER — TELEPHONE (OUTPATIENT)
Dept: GASTROENTEROLOGY | Facility: HOSPITAL | Age: 40
End: 2025-03-06
Payer: COMMERCIAL

## 2025-03-06 NOTE — TELEPHONE ENCOUNTER
Radha Smith Left voicemail regarding stool lab results. Pt States she was told Collection Requirements were not met. And that stool test should be repeated.     Patient also has complaints of blood and mucus in stool the last two weeks.

## 2025-03-10 ENCOUNTER — APPOINTMENT (OUTPATIENT)
Dept: SLEEP MEDICINE | Facility: HOSPITAL | Age: 40
End: 2025-03-10
Payer: COMMERCIAL

## 2025-03-10 ENCOUNTER — APPOINTMENT (OUTPATIENT)
Dept: GASTROENTEROLOGY | Facility: CLINIC | Age: 40
End: 2025-03-10
Payer: COMMERCIAL

## 2025-03-10 NOTE — PROGRESS NOTES
"AUDIOLOGY ADULT AUDIOMETRIC EVALUATION      Name:  Radha Smith   :  1985  Age:  40 y.o.  Date of Evaluation:  3/13/2025    Time: [id number]    IMPRESSIONS     Hearing sensitivity within normal limits for 125-8000 Hz. in both ears.  Word recognition in quiet is excellent in both ears.  Tympanometry indicates normal middle ear pressure and tympanic membrane mobility in both ears.     RECOMMENDATIONS     Continue medical follow up with primary care provider and/or Ears Nose and Throat (ENT) provider as recommended.  Return for hearing testing as medically indicated or if a change in symptoms arise.  Avoid exposure to loud sounds by moving away from the noise, turning down the volume, or wearing proper hearing protection correctly.    HISTORY     Reason for visit:  Ms. Smith is seen today for an initial audiologic evaluation prior to an evaluation with Adelina Cadet APRN.CNP due to dizziness. She describes a history of Mal de Débarquement Syndrome and going to vestibular PT but they could not recreate her symptoms. She also reports having ear infections when she gets a cold and goes to [facility]. Additionally, she reports being established with neurology (Christophe Hess MD) due to Chiari I malformation. She has seen Dr. Narendra Levi for nasal drainage, throat clearing, LPR. History obtained from patient report and chart review.     EVALUATION     See scanned audiogram in \"Media\"     TEST RESULTS     Otoscopic Evaluation:  Right Ear: Ear canal clear, tympanic membrane visualized.  Left Ear: Ear canal clear, tympanic membrane visualized.    Tympanometry (226 Hz):  Right Ear: Type A, middle ear pressure and tympanic membrane compliance within normal limits.   Left Ear: Type A, middle ear pressure and tympanic membrane compliance within normal limits.     Acoustic Reflexes:   Right Ear: (Ipsilateral) Responses present at 500-4000 Hz.  Left Ear: (Ipsilateral) Responses present at 500-4000 " Hz.    Distortion Product Otoacoustic Emissions:  Right Ear: Did not test.  Left Ear:  Did not test.  Present OAEs suggest normal or near cochlear outer hair cell function for corresponding frequency region(s). Absent OAEs with normal middle ear function can be consistent with some degree of hearing loss. Assessment of cochlear outer hair cell function may be impacted by outer or middle ear function.    Test technique:  Pure Tone Audiometry via insert earphones  Reliability: Good    Pure Tone Audiometry:    Right Ear: Hearing sensitivity within normal limits for 125-8000 Hz.  Left Ear: Hearing sensitivity within normal limits for 125-8000 Hz.    Speech Audiometry:   Right Ear:  Speech Reception Threshold (SRT) was obtained at 15 dB HL. Word Recognition scores were excellent (100%) in quiet when words were presented at 55 dB HL. These results are based on [facility] Auditory Test No.6 (NU-6) Ordered by difficulty (N=10).   Left Ear:  Speech Reception Threshold (SRT) was obtained at 15 dB HL. Word Recognition scores were excellent (100%) in quiet when words were presented at 55 dB HL. These results are based on [facility] Auditory Test No.6 (NU-6) Ordered by difficulty (N=10).     Comparison of today's results with previous test results: No previous results available.           PATIENT EDUCATION     Discussed results and recommendations with Ms. Smith. Questions were addressed and the patient was encouraged to contact our department at [phone] should concerns arise.       Tonya Orozco, CCC-A  Clinical Audiologist    Degree of   Hearing Sensitivity dB Range   Within Normal Limits (WNL) 0 - 20   Slight 25   Mild 26 - 40   Moderate 41 - 55   Moderately-Severe 56 - 70   Severe 71 - 90   Profound 91 +     Key   CHL Conductive Hearing Loss   ECV Ear Canal Volume   HA Hearing Aid   NIHL Noise-Induced Hearing Loss   PTA Pure Tone Average   SNHL Sensorineural Hearing Loss   TM Tympanic  Membrane   WNL Within Normal Limits

## 2025-03-12 ENCOUNTER — TELEPHONE (OUTPATIENT)
Dept: GASTROENTEROLOGY | Facility: HOSPITAL | Age: 40
End: 2025-03-12
Payer: COMMERCIAL

## 2025-03-12 DIAGNOSIS — R10.13 DYSPEPSIA: ICD-10-CM

## 2025-03-12 DIAGNOSIS — K51.919 ULCERATIVE COLITIS WITH COMPLICATION, UNSPECIFIED LOCATION (MULTI): Primary | ICD-10-CM

## 2025-03-12 RX ORDER — MESALAMINE 0.38 G/1
1.5 CAPSULE, EXTENDED RELEASE ORAL DAILY
Qty: 120 CAPSULE | Refills: 0 | Status: SHIPPED | OUTPATIENT
Start: 2025-03-12 | End: 2025-04-11

## 2025-03-12 NOTE — TELEPHONE ENCOUNTER
Spoke with patient. Eructation improved however worsening diarrhea/bloody stools. . Plan to restart UC therapy and repeat colonoscopy, last 2022. Will also add EGD given dyspepsia although this has somewhat improved. Rx sent. All questions answered and pt in agreement with plan. Of note, also requesting referral to dietician, nora.     Sylvia Wagoner MD

## 2025-03-13 ENCOUNTER — CLINICAL SUPPORT (OUTPATIENT)
Dept: AUDIOLOGY | Facility: CLINIC | Age: 40
End: 2025-03-13
Payer: COMMERCIAL

## 2025-03-13 DIAGNOSIS — R42 DYSEQUILIBRIUM: Primary | ICD-10-CM

## 2025-03-13 PROCEDURE — 92550 TYMPANOMETRY & REFLEX THRESH: CPT | Mod: 52 | Performed by: AUDIOLOGIST

## 2025-03-13 PROCEDURE — 92557 COMPREHENSIVE HEARING TEST: CPT | Performed by: AUDIOLOGIST

## 2025-03-20 ENCOUNTER — APPOINTMENT (OUTPATIENT)
Dept: OTOLARYNGOLOGY | Facility: CLINIC | Age: 40
End: 2025-03-20
Payer: COMMERCIAL

## 2025-03-20 ENCOUNTER — PATIENT OUTREACH (OUTPATIENT)
Dept: CARE COORDINATION | Facility: CLINIC | Age: 40
End: 2025-03-20
Payer: COMMERCIAL

## 2025-03-20 NOTE — PROGRESS NOTES
Outreach call regarding nutrition referral for UC. NAYAN with contact number [phone] if Radha would like to call back and schedule an appointment.

## 2025-03-27 ENCOUNTER — PATIENT OUTREACH (OUTPATIENT)
Dept: CARE COORDINATION | Facility: CLINIC | Age: 40
End: 2025-03-27
Payer: COMMERCIAL

## 2025-03-27 NOTE — PROGRESS NOTES
Second outreach call to Radha regarding nutrition referral. Unable to LVM as mailbox is full. I can be reached at [phone] for scheduling.

## 2025-03-31 ENCOUNTER — TELEPHONE (OUTPATIENT)
Dept: GASTROENTEROLOGY | Facility: HOSPITAL | Age: 40
End: 2025-03-31

## 2025-03-31 ENCOUNTER — APPOINTMENT (OUTPATIENT)
Dept: INTEGRATIVE MEDICINE | Facility: CLINIC | Age: 40
End: 2025-03-31
Payer: COMMERCIAL

## 2025-03-31 NOTE — TELEPHONE ENCOUNTER
Ms. Burton called in with complaints or Bleeding During Bowel movements for the past 3 weeks.     Patient also have complains of having the urge to have a BM whenever she eat or drink anything which mostly consist of Blood.     Pt request a prescription for Prednisone and a call from provider.

## 2025-04-02 DIAGNOSIS — K51.911 ULCERATIVE COLITIS WITH RECTAL BLEEDING, UNSPECIFIED LOCATION (MULTI): Primary | ICD-10-CM

## 2025-04-02 RX ORDER — PREDNISONE 5 MG/1
TABLET ORAL
Qty: 252 TABLET | Refills: 0 | Status: SHIPPED | OUTPATIENT
Start: 2025-04-02 | End: 2025-05-28

## 2025-04-02 NOTE — PROGRESS NOTES
Pt called with worsening hematochezia and diarrhea c/w UC flare. Check CBC. Start prednisone taper. EGD/colonoscopy already ordered however scheduled in sept 2025, will move this up. Will likely need to initiate biologics as mesalamine no longer controlling symptoms pending colonoscopy results.     Sylvia Wagoner MD

## 2025-04-03 ENCOUNTER — PATIENT OUTREACH (OUTPATIENT)
Dept: CARE COORDINATION | Facility: CLINIC | Age: 40
End: 2025-04-03
Payer: COMMERCIAL

## 2025-04-03 NOTE — PROGRESS NOTES
Called Radha in regard to nutrition referral and we were able to schedule a virtual visit for mid-April.

## 2025-04-04 LAB
ERYTHROCYTE [DISTWIDTH] IN BLOOD BY AUTOMATED COUNT: 13 % (ref 11–15)
HCT VFR BLD AUTO: 34.6 % (ref 35–45)
HGB BLD-MCNC: 10.7 G/DL (ref 11.7–15.5)
MCH RBC QN AUTO: 25.5 PG (ref 27–33)
MCHC RBC AUTO-ENTMCNC: 30.9 G/DL (ref 32–36)
MCV RBC AUTO: 82.6 FL (ref 80–100)
PLATELET # BLD AUTO: 343 THOUSAND/UL (ref 140–400)
PMV BLD REES-ECKER: 9.5 FL (ref 7.5–12.5)
RBC # BLD AUTO: 4.19 MILLION/UL (ref 3.8–5.1)
WBC # BLD AUTO: 8.9 THOUSAND/UL (ref 3.8–10.8)

## 2025-04-14 ENCOUNTER — TELEMEDICINE CLINICAL SUPPORT (OUTPATIENT)
Dept: CARE COORDINATION | Facility: CLINIC | Age: 40
End: 2025-04-14
Payer: COMMERCIAL

## 2025-04-14 ENCOUNTER — APPOINTMENT (OUTPATIENT)
Dept: GASTROENTEROLOGY | Facility: CLINIC | Age: 40
End: 2025-04-14
Payer: COMMERCIAL

## 2025-04-14 ENCOUNTER — APPOINTMENT (OUTPATIENT)
Dept: OTOLARYNGOLOGY | Facility: CLINIC | Age: 40
End: 2025-04-14
Payer: COMMERCIAL

## 2025-04-14 DIAGNOSIS — K51.919 ULCERATIVE COLITIS WITH COMPLICATION, UNSPECIFIED LOCATION (MULTI): ICD-10-CM

## 2025-04-14 NOTE — PROGRESS NOTES
INITIAL NUTRITION EDUCATION VISIT    Reason for Nutrition Visit:  Pt is a 40 y.o. female being seen Virtual referred for  ulcerative colitis    Past Medical Hx:  Patient Active Problem List   Diagnosis    Diarrhea    Abnormal CT scan, sinus    Anxiety and depression    Arthropathy of left temporomandibular joint    Atypical chest pain    Bilateral edema of lower extremity    Brain tumor (Multi)    Calcification of brain    Chiari I malformation (Multi)    Chronic anemia    Ulcerative colitis    Erythema nodosum    Exocrine pancreatic insufficiency ([facility]HS-HCC)    Hematochezia    Hidradenitis suppurativa    Hyperglycemia    Hyperlipidemia, mild    Leukocytosis    Pseudotumor cerebri    Raised intracranial pressure    Vitamin D deficiency    Other fatigue    Healthcare maintenance    Breast cancer screening, high risk patient    Excessive daytime sleepiness    BMI 50.0-59.9, adult (Multi)    Iron deficiency anemia due to chronic blood loss        Current Medications:   Current Outpatient Medications on File Prior to Visit   Medication Sig Dispense Refill    albuterol 90 mcg/actuation inhaler Inhale 2 puffs every 6 hours if needed.      benzonatate (Tessalon) 100 mg capsule Take 1 capsule (100 mg) by mouth 3 times a day as needed.      biotin 1 mg capsule Take 1 capsule (1 mg) by mouth once daily.      cholecalciferol (Vitamin D-3) 125 MCG (5000 UT) capsule Take 1 capsule (125 mcg) by mouth once daily.      cyanocobalamin, vitamin B-12, (Vitamin B-12) 1,000 mcg tablet extended release Take 1 tablet (1,000 mcg) by mouth once daily.      fluticasone (Flonase) 50 mcg/actuation nasal spray USE 1 TO 2 SPRAYS INTO EACH NOSTRIL ONCE DAILY 16 mL 2    hydrocortisone 2.5 % cream Apply topically 2 times a day as needed for irritation or rash for up to 7 days. 30 g 0    iron, carbonyl 25 mg iron tablet Take 1 tablet by mouth once daily.      mesalamine ER (Apriso) 0.375 gram 24 hr capsule Take 4 capsules (1.5 g) by mouth once daily.  120 capsule 0    montelukast (Singulair) 10 mg tablet TAKE 1 TABLET (10 MG) BY MOUTH ONCE DAILY AT BEDTIME. 90 tablet 1    pantoprazole (ProtoNix) 40 mg EC tablet Take 1 tablet (40 mg) by mouth once daily in the morning. Take before meals. Do not crush, chew, or split. 30 tablet 2    predniSONE (Deltasone) 5 mg tablet Take 8 tablets (40 mg) by mouth once daily for 7 days, THEN 7 tablets (35 mg) once daily for 7 days, THEN 6 tablets (30 mg) once daily for 7 days, THEN 5 tablets (25 mg) once daily for 7 days, THEN 4 tablets (20 mg) once daily for 7 days, THEN 3 tablets (15 mg) once daily for 7 days, THEN 2 tablets (10 mg) once daily for 7 days, THEN 1 tablet (5 mg) once daily for 7 days. Take by mouth as directed. 252 tablet 0    pyridoxine (Vitamin B-6) 50 mg tablet Take 1 tablet (50 mg) by mouth once daily.      sertraline (Zoloft) 50 mg tablet Take 1 tablet (50 mg) by mouth once daily. 90 tablet 1     No current facility-administered medications on file prior to visit.       Food & Nutrition Related History:  Dietary Considerations:  plant based  Intolerance: broccoli, kale, eggs  GI Symptoms : diarrhea, constipation, and abdominal pain, gas  Chewing has needed multiple root canals  Food Preparation: Patient  Grocery Shopping: Patient       24 Diet Recall:  Meal 1: avocado spread, just eggs or malt-o-meal, pumpkin seeds  Meal 2: salad with spinach, kale or mixed greens, cutie oranges  Meal 3: salmon     Snacks:  enjoys sweets when can tolerate-doughnuts, cookies, cake   Beverages: did not get to this fully today  Eating out:  chik-ryan-et  Alcohol Intake:  none per GI note  Self-Identified Challenges: trying to find what foods are best when she has flare ups as well as when having diarrhea or constipation    Physical Activity: when feels well  Yes - goes to gym or does water aerobics  Types of Activities: Gym Membership      Labs:  Lab Results   Component Value Date    HGBA1C 5.4 09/24/2024    HGBA1C 5.6 03/23/2024  "   HGBA1C 5.7 (A) 05/23/2023     09/24/2024    K 3.6 09/24/2024     09/24/2024    CO2 27 09/24/2024    BUN 7 09/24/2024    CREATININE 0.49 (L) 09/24/2024    CALCIUM 8.9 09/24/2024    ALBUMIN 4.1 09/24/2024    PROT 7.4 09/24/2024    BILITOT 0.7 09/24/2024    ALKPHOS 75 09/24/2024    ALT 11 09/24/2024    AST 14 09/24/2024    GLUCOSE 80 09/24/2024     Lab Results   Component Value Date    CHOL 144 09/24/2024    LDLCALC 91 09/24/2024    TRIG 59 09/24/2024    HDL 40.9 09/24/2024    LDLF 80 05/23/2023        Comments:     CMP:    Lipid panel:    Vitamin D:    A1C:      Anthropometrics:   Ht Readings from Last 1 Encounters:   02/03/25 1.562 m (5' 1.5\")      BMI Readings from Last 1 Encounters:   02/03/25 52.05 kg/m²      Wt Readings from Last 10 Encounters:   02/03/25 127 kg (280 lb)   09/24/24 122 kg (270 lb)   06/17/24 121 kg (267 lb)   06/17/24 122 kg (270 lb)   06/10/24 122 kg (270 lb)   05/16/24 120 kg (263 lb 12.8 oz)   04/25/24 121 kg (267 lb)   03/19/24 122 kg (269 lb)   12/04/23 121 kg (265 lb 14 oz)   10/09/23 120 kg (265 lb 3.4 oz)      Visit Summary   Radha shared her medical history and what she is looking for with our visits. Reviewed her chart and medical history. She has upcoming scopes to help see what is going on now GI-wise since she had a flare up of UC with bloody diarrhea for 3 weeks. She is on prednisone and notes the bleeding stopped and she is able to do her job without having to not eat all day to prevent diarrheal episodes. She has about 2 more weeks of tapering the steroid and will see how that goes. She is looking to work with this RD for tips based on labs and test outcomes, visits with her other providers to best meet her GI needs for best quality of life during various GI issues. She is working with functional medicine for a plant based diet and that is helping. Today we talked about what is best nutritionally during flare ups (lower fiber diet) of colitis and what she can do " when GI is healed such as gradually increasing fiber to a higher fiber diet. Education provided about the hunger/fullness tool to help guide eating when she is able to eat more normally to help her meet her health goals while including favorite sweet foods she enjoys. Handouts for IBD and the hunger/fullness scale sent to her email for review.    Nutrition Diagnosis:  Diagnosis Statement 1:  Diagnosis Status: New  Diagnosis : Altered GI function  related to uncertainty how to apply nutrition information as evidenced by conditions associated with a chronic condition (DM, HTN, CAD, ED, DE, GI, etc.)    Nutrition Interventions:  Provided nutrition education on the following topic(s):  hunger fullness tool, IBD diet during flare ups vs not in flare up brief discussion   using ACONutritionCounseling: Motivational Interviewing    Nutrition Goals:  Nutrition Goals : Consistent meal/snack pattern  Improve GI function  Increase awareness and respond to hunger cues  Increase awareness and respond to satiety cues  Promote Healing   Review materials sent  Continue to note how foods affect you     Follow up Plan  Will follow-up x 6 wks

## 2025-04-30 ENCOUNTER — APPOINTMENT (OUTPATIENT)
Dept: PRIMARY CARE | Facility: CLINIC | Age: 40
End: 2025-04-30
Payer: COMMERCIAL

## 2025-05-02 ENCOUNTER — OFFICE VISIT (OUTPATIENT)
Dept: PRIMARY CARE | Facility: CLINIC | Age: 40
End: 2025-05-02
Payer: COMMERCIAL

## 2025-05-02 VITALS
WEIGHT: 285.5 LBS | BODY MASS INDEX: 53.9 KG/M2 | RESPIRATION RATE: 16 BRPM | SYSTOLIC BLOOD PRESSURE: 112 MMHG | DIASTOLIC BLOOD PRESSURE: 72 MMHG | HEART RATE: 88 BPM | TEMPERATURE: 98.1 F | OXYGEN SATURATION: 97 % | HEIGHT: 61 IN

## 2025-05-02 DIAGNOSIS — G93.5 CHIARI I MALFORMATION (MULTI): ICD-10-CM

## 2025-05-02 DIAGNOSIS — K51.919 ULCERATIVE COLITIS WITH COMPLICATION, UNSPECIFIED LOCATION (MULTI): Primary | ICD-10-CM

## 2025-05-02 PROBLEM — E66.01 MORBID OBESITY (MULTI): Status: ACTIVE | Noted: 2025-05-02

## 2025-05-02 PROBLEM — E66.01 MORBID OBESITY (MULTI): Status: RESOLVED | Noted: 2025-05-02 | Resolved: 2025-05-02

## 2025-05-02 PROCEDURE — 99213 OFFICE O/P EST LOW 20 MIN: CPT | Performed by: INTERNAL MEDICINE

## 2025-05-02 PROCEDURE — 3008F BODY MASS INDEX DOCD: CPT | Performed by: INTERNAL MEDICINE

## 2025-05-02 SDOH — ECONOMIC STABILITY: FOOD INSECURITY: WITHIN THE PAST 12 MONTHS, THE FOOD YOU BOUGHT JUST DIDN'T LAST AND YOU DIDN'T HAVE MONEY TO GET MORE.: NEVER TRUE

## 2025-05-02 SDOH — ECONOMIC STABILITY: FOOD INSECURITY: WITHIN THE PAST 12 MONTHS, YOU WORRIED THAT YOUR FOOD WOULD RUN OUT BEFORE YOU GOT MONEY TO BUY MORE.: NEVER TRUE

## 2025-05-02 ASSESSMENT — ENCOUNTER SYMPTOMS
OCCASIONAL FEELINGS OF UNSTEADINESS: 0
LOSS OF SENSATION IN FEET: 0
DEPRESSION: 0

## 2025-05-02 ASSESSMENT — PAIN SCALES - GENERAL: PAINLEVEL_OUTOF10: 0-NO PAIN

## 2025-05-02 ASSESSMENT — LIFESTYLE VARIABLES: HOW MANY STANDARD DRINKS CONTAINING ALCOHOL DO YOU HAVE ON A TYPICAL DAY: PATIENT DOES NOT DRINK

## 2025-05-02 NOTE — PROGRESS NOTES
"Subjective   Patient ID: Radha Smith is a 40 y.o. female who presents for New Patient Visit (NPV).    HPI     Review of Systems    Objective   /72   Pulse 88   Temp 36.7 °C (98.1 °F) (Temporal)   Resp 16   Ht 1.549 m (5' 1\")   Wt 130 kg (285 lb 8 oz)   SpO2 97%   BMI 53.94 kg/m²     Physical Exam  Gen appearance:   A & O: alert and oriented x 3  CV: RRR   Lungs: CTA bilaterally  Extr: no edema   Assessment/Plan   {Assess/PlanSmartLinks:84960}       "

## 2025-05-03 DIAGNOSIS — D50.9 IRON DEFICIENCY ANEMIA, UNSPECIFIED IRON DEFICIENCY ANEMIA TYPE: ICD-10-CM

## 2025-05-05 ASSESSMENT — ENCOUNTER SYMPTOMS
VOMITING: 0
FEVER: 0
SHORTNESS OF BREATH: 0
CHILLS: 0
NAUSEA: 0
ABDOMINAL PAIN: 0

## 2025-05-08 ENCOUNTER — TELEPHONE (OUTPATIENT)
Dept: GASTROENTEROLOGY | Facility: HOSPITAL | Age: 40
End: 2025-05-08
Payer: COMMERCIAL

## 2025-05-08 ENCOUNTER — TELEPHONE (OUTPATIENT)
Dept: OBSTETRICS AND GYNECOLOGY | Facility: CLINIC | Age: 40
End: 2025-05-08
Payer: COMMERCIAL

## 2025-05-09 ENCOUNTER — APPOINTMENT (OUTPATIENT)
Dept: GASTROENTEROLOGY | Facility: CLINIC | Age: 40
End: 2025-05-09
Payer: COMMERCIAL

## 2025-05-12 ENCOUNTER — APPOINTMENT (OUTPATIENT)
Dept: GASTROENTEROLOGY | Facility: CLINIC | Age: 40
End: 2025-05-12
Payer: COMMERCIAL

## 2025-05-13 ENCOUNTER — ANESTHESIA EVENT (OUTPATIENT)
Dept: GASTROENTEROLOGY | Facility: HOSPITAL | Age: 40
End: 2025-05-13
Payer: COMMERCIAL

## 2025-05-13 ENCOUNTER — HOSPITAL ENCOUNTER (OUTPATIENT)
Dept: GASTROENTEROLOGY | Facility: HOSPITAL | Age: 40
Discharge: HOME | End: 2025-05-13
Payer: COMMERCIAL

## 2025-05-13 ENCOUNTER — ANESTHESIA (OUTPATIENT)
Dept: GASTROENTEROLOGY | Facility: HOSPITAL | Age: 40
End: 2025-05-13
Payer: COMMERCIAL

## 2025-05-13 VITALS
TEMPERATURE: 97.7 F | DIASTOLIC BLOOD PRESSURE: 77 MMHG | SYSTOLIC BLOOD PRESSURE: 120 MMHG | OXYGEN SATURATION: 96 % | BODY MASS INDEX: 53.28 KG/M2 | HEART RATE: 66 BPM | HEIGHT: 61 IN | WEIGHT: 282.19 LBS | RESPIRATION RATE: 16 BRPM

## 2025-05-13 DIAGNOSIS — K51.919 ULCERATIVE COLITIS WITH COMPLICATION, UNSPECIFIED LOCATION (MULTI): ICD-10-CM

## 2025-05-13 LAB — PREGNANCY TEST URINE, POC: NEGATIVE

## 2025-05-13 PROCEDURE — 2500000004 HC RX 250 GENERAL PHARMACY W/ HCPCS (ALT 636 FOR OP/ED): Mod: JW | Performed by: NURSE ANESTHETIST, CERTIFIED REGISTERED

## 2025-05-13 PROCEDURE — 7100000010 HC PHASE TWO TIME - EACH INCREMENTAL 1 MINUTE

## 2025-05-13 PROCEDURE — 3700000001 HC GENERAL ANESTHESIA TIME - INITIAL BASE CHARGE

## 2025-05-13 PROCEDURE — 45380 COLONOSCOPY AND BIOPSY: CPT | Performed by: STUDENT IN AN ORGANIZED HEALTH CARE EDUCATION/TRAINING PROGRAM

## 2025-05-13 PROCEDURE — 7100000009 HC PHASE TWO TIME - INITIAL BASE CHARGE

## 2025-05-13 PROCEDURE — 81025 URINE PREGNANCY TEST: CPT | Performed by: STUDENT IN AN ORGANIZED HEALTH CARE EDUCATION/TRAINING PROGRAM

## 2025-05-13 PROCEDURE — 3700000002 HC GENERAL ANESTHESIA TIME - EACH INCREMENTAL 1 MINUTE

## 2025-05-13 RX ORDER — MESALAMINE 0.38 G/1
1.5 CAPSULE, EXTENDED RELEASE ORAL DAILY
Qty: 120 CAPSULE | Refills: 11 | Status: SHIPPED | OUTPATIENT
Start: 2025-05-13

## 2025-05-13 RX ORDER — SODIUM CHLORIDE 0.9 % (FLUSH) 0.9 %
SYRINGE (ML) INJECTION AS NEEDED
Status: DISCONTINUED | OUTPATIENT
Start: 2025-05-13 | End: 2025-05-13

## 2025-05-13 RX ORDER — PROPOFOL 10 MG/ML
INJECTION, EMULSION INTRAVENOUS AS NEEDED
Status: DISCONTINUED | OUTPATIENT
Start: 2025-05-13 | End: 2025-05-13

## 2025-05-13 RX ADMIN — Medication 30 ML: at 11:05

## 2025-05-13 RX ADMIN — PROPOFOL 50 MG: 10 INJECTION, EMULSION INTRAVENOUS at 10:55

## 2025-05-13 RX ADMIN — PROPOFOL 50 MG: 10 INJECTION, EMULSION INTRAVENOUS at 11:04

## 2025-05-13 RX ADMIN — PROPOFOL 50 MG: 10 INJECTION, EMULSION INTRAVENOUS at 10:59

## 2025-05-13 RX ADMIN — PROPOFOL 50 MG: 10 INJECTION, EMULSION INTRAVENOUS at 10:40

## 2025-05-13 RX ADMIN — PROPOFOL 50 MG: 10 INJECTION, EMULSION INTRAVENOUS at 10:50

## 2025-05-13 RX ADMIN — PROPOFOL 50 MG: 10 INJECTION, EMULSION INTRAVENOUS at 10:45

## 2025-05-13 RX ADMIN — PROPOFOL 200 MG: 10 INJECTION, EMULSION INTRAVENOUS at 10:36

## 2025-05-13 SDOH — HEALTH STABILITY: MENTAL HEALTH: CURRENT SMOKER: 0

## 2025-05-13 ASSESSMENT — ENCOUNTER SYMPTOMS
SHORTNESS OF BREATH: 0
TROUBLE SWALLOWING: 0
NAUSEA: 0
ARTHRALGIAS: 0
SPEECH DIFFICULTY: 0
CONSTIPATION: 0
SLEEP DISTURBANCE: 0
LIGHT-HEADEDNESS: 0
FEVER: 0
CONFUSION: 0
JOINT SWELLING: 0
ABDOMINAL DISTENTION: 0
ABDOMINAL PAIN: 0
CHILLS: 0
COLOR CHANGE: 0
HEADACHES: 0
BLOOD IN STOOL: 0
WHEEZING: 0
DIFFICULTY URINATING: 0
VOMITING: 0
DIZZINESS: 0
DIARRHEA: 0
UNEXPECTED WEIGHT CHANGE: 0
COUGH: 0

## 2025-05-13 ASSESSMENT — PAIN - FUNCTIONAL ASSESSMENT
PAIN_FUNCTIONAL_ASSESSMENT: 0-10
PAIN_FUNCTIONAL_ASSESSMENT: 0-10

## 2025-05-13 ASSESSMENT — COLUMBIA-SUICIDE SEVERITY RATING SCALE - C-SSRS
1. IN THE PAST MONTH, HAVE YOU WISHED YOU WERE DEAD OR WISHED YOU COULD GO TO SLEEP AND NOT WAKE UP?: NO
6. HAVE YOU EVER DONE ANYTHING, STARTED TO DO ANYTHING, OR PREPARED TO DO ANYTHING TO END YOUR LIFE?: NO
2. HAVE YOU ACTUALLY HAD ANY THOUGHTS OF KILLING YOURSELF?: NO

## 2025-05-13 ASSESSMENT — PAIN SCALES - GENERAL
PAINLEVEL_OUTOF10: 0 - NO PAIN
PAINLEVEL_OUTOF10: 3
PAINLEVEL_OUTOF10: 0 - NO PAIN

## 2025-05-13 ASSESSMENT — PAIN DESCRIPTION - DESCRIPTORS: DESCRIPTORS: CRAMPING

## 2025-05-13 NOTE — ANESTHESIA PREPROCEDURE EVALUATION
Patient: Radha Smith    Procedure Information       Anesthesia Start Date/Time: 05/13/25 0954    Scheduled providers: Sylvia Wagoner MD; Raheem Patiño MD; LEONARDA Light-JERROD, DNP    Procedure: COLONOSCOPY    Location: [facility] [place]            Relevant Problems   Cardiac   (+) Atypical chest pain   (+) Hyperlipidemia, mild      Neuro   (+) Anxiety and depression   (+) Pseudotumor cerebri      GI   (+) Ulcerative colitis      Hematology   (+) Chronic anemia   (+) Iron deficiency anemia due to chronic blood loss      ID   (+) Hidradenitis suppurativa       Clinical information reviewed:   Tobacco  Allergies  Meds   Med Hx  Surg Hx  OB Status  Fam Hx  Soc   Hx        NPO Detail:  NPO/Void Status  Date of Last Liquid: 05/13/25  Time of Last Liquid: 0300  Date of Last Solid: 05/12/25  Time of Last Solid: 0900         Physical Exam    Airway  Mallampati: II  TM distance: >3 FB  Neck ROM: full  Mouth opening: 3 or more finger widths     Cardiovascular - normal exam   Dental - normal exam     Pulmonary - normal exam   Abdominal - normal exam(+) obese             Anesthesia Plan    History of general anesthesia?: no  History of complications of general anesthesia?: no    ASA 3     MAC     The patient is not a current smoker.    intravenous induction   Anesthetic plan and risks discussed with patient.  Use of blood products discussed with patient who.    Plan discussed with attending and CRNA.

## 2025-05-13 NOTE — ANESTHESIA POSTPROCEDURE EVALUATION
Patient: Radha Smith    Procedure Summary       Date: 05/13/25 Room / Location: [facility]    Anesthesia Start: 0957 Anesthesia Stop: 1115    Procedure: COLONOSCOPY Diagnosis: Ulcerative colitis with complication, unspecified location (Multi)    Scheduled Providers: Sylvia Wagoner MD; Raheem Patiño MD; LEONARDA Light-JERROD, DNP Responsible Provider: Raheem Patiño MD    Anesthesia Type: MAC ASA Status: 3            Anesthesia Type: MAC    Vitals Value Taken Time   /82 05/13/25 11:11   Temp 36.5 °C (97.7 °F) 05/13/25 11:11   Pulse 80 05/13/25 11:22   Resp 14 05/13/25 11:11   SpO2 100 05/13/25 11:22       Anesthesia Post Evaluation    Patient location during evaluation: PACU  Patient participation: complete - patient participated  Level of consciousness: awake  Pain management: adequate  Airway patency: patent  Cardiovascular status: acceptable  Respiratory status: acceptable  Hydration status: acceptable  Postoperative Nausea and Vomiting: none        There were no known notable events for this encounter.

## 2025-05-13 NOTE — H&P
History Of Present Illness  Radha Smith is a 40 y.o. female presenting with hematochezia, diarrhea, hx of colitis here for colonoscopy.     Past Medical History  Medical History[1]  Surgical History  Surgical History[2]  Social History  She reports that she has never smoked. She has never been exposed to tobacco smoke. She has never used smokeless tobacco. She reports that she does not currently use alcohol. She reports that she does not currently use drugs.    Family History  Family History[3]     Allergies  Allergies[4]  Review of Systems   Constitutional:  Negative for chills, fever and unexpected weight change.   HENT:  Negative for congestion and trouble swallowing.    Respiratory:  Negative for cough, shortness of breath and wheezing.    Cardiovascular:  Negative for chest pain.   Gastrointestinal:  Negative for abdominal distention, abdominal pain, blood in stool, constipation, diarrhea, nausea and vomiting.   Genitourinary:  Negative for difficulty urinating.   Musculoskeletal:  Negative for arthralgias and joint swelling.   Skin:  Negative for color change.   Neurological:  Negative for dizziness, speech difficulty, light-headedness and headaches.   Psychiatric/Behavioral:  Negative for confusion and sleep disturbance.         Physical Exam  Constitutional:       General: She is awake.      Appearance: Normal appearance.   HENT:      Head: Normocephalic and atraumatic.      Nose: Nose normal.      Mouth/Throat:      Mouth: Mucous membranes are moist.   Eyes:      Pupils: Pupils are equal, round, and reactive to light.   Neck:      Thyroid: No thyroid mass.      Trachea: Phonation normal.   Cardiovascular:      Rate and Rhythm: Normal rate and regular rhythm.   Pulmonary:      Effort: Pulmonary effort is normal. No respiratory distress.      Breath sounds: Normal air entry. No decreased breath sounds, wheezing, rhonchi or rales.   Abdominal:      General: Bowel sounds are normal. There is no distension.  "     Palpations: Abdomen is soft.      Tenderness: There is no abdominal tenderness.   Musculoskeletal:      Cervical back: Neck supple.      Right lower leg: No edema.      Left lower leg: No edema.   Skin:     General: Skin is warm.      Capillary Refill: Capillary refill takes less than 2 seconds.   Neurological:      General: No focal deficit present.      Mental Status: She is alert and oriented to person, place, and time. Mental status is at baseline.      Cranial Nerves: Cranial nerves 2-12 are intact.      Motor: Motor function is intact.   Psychiatric:         Attention and Perception: Attention and perception normal.         Mood and Affect: Mood normal.         Speech: Speech normal.         Behavior: Behavior normal.          Last Recorded Vitals  Blood pressure 135/70, pulse 82, temperature 36.3 °C (97.3 °F), temperature source Temporal, resp. rate 16, height 1.549 m (5' 1\"), weight 128 kg (282 lb 3 oz), last menstrual period 05/12/2025, SpO2 100%.    Assessment/Plan   hematochezia, diarrhea, hx of colitis here for colonoscopy     Sylvia Wagoner MD       [1]   Past Medical History:  Diagnosis Date    Low back pain, unspecified 07/27/2022    Chronic left-sided low back pain without sciatica    Personal history of other diseases of the digestive system     History of proctitis    Personal history of other infectious and parasitic diseases     History of gonorrhea of rectum    Ulcerative colitis, unspecified, without complications (Multi) 10/20/2022    Ulcerative colitis   [2]   Past Surgical History:  Procedure Laterality Date    BREAST CYST ASPIRATION Left     Axilla    OTHER SURGICAL HISTORY  02/05/2019    [place] tooth extraction    OTHER SURGICAL HISTORY  02/05/2019    Gallbladder surgery    OTHER SURGICAL HISTORY  06/22/2020    Colonoscopy    OTHER SURGICAL HISTORY  06/22/2020    Esophagogastroduodenoscopy   [3]   Family History  Problem Relation Name Age of Onset    Breast cancer Mother  " 35    No Known Problems Father      Breast cancer Mother's Sister  25    Lung cancer Father's Sister  60 - 69    Testicular cancer Maternal Grandfather  50    Ovarian cancer Other  40 - 49        maternal cousin   [4]   Allergies  Allergen Reactions    Ceftibuten Other     nausea    Cefuroxime Axetil Diarrhea and Nausea Only    Nsaids (Non-Steroidal Anti-Inflammatory Drug) Other     can cause bleeding, pt has UC

## 2025-05-14 ASSESSMENT — PAIN SCALES - GENERAL: PAINLEVEL_OUTOF10: 0 - NO PAIN

## 2025-05-15 NOTE — TELEPHONE ENCOUNTER
Returned patient call regarding valium refill. Patient reported experiencing anal spasms. Patient last valium contract was  7/14/2023. Called patient back to schedule follow up appointment for contact.

## 2025-05-18 DIAGNOSIS — M62.89 PELVIC FLOOR DYSFUNCTION: Primary | ICD-10-CM

## 2025-05-21 RX ORDER — DIAZEPAM 10 MG/2G
GEL RECTAL
Qty: 1 EACH | Refills: 0 | Status: SHIPPED | OUTPATIENT
Start: 2025-05-21 | End: 2025-06-20

## 2025-05-26 LAB
LABORATORY COMMENT REPORT: NORMAL
PATH REPORT.FINAL DX SPEC: NORMAL
PATH REPORT.GROSS SPEC: NORMAL
PATH REPORT.RELEVANT HX SPEC: NORMAL
PATH REPORT.TOTAL CANCER: NORMAL

## 2025-05-30 ENCOUNTER — APPOINTMENT (OUTPATIENT)
Dept: CARE COORDINATION | Facility: CLINIC | Age: 40
End: 2025-05-30
Payer: COMMERCIAL

## 2025-05-30 NOTE — PROGRESS NOTES
"Nutrition Follow-up   Dietitian  follow-up. Pt is being seen  virtually for UC/GI concerns.    Labs  Lab Results   Component Value Date    HGBA1C 5.4 09/24/2024    HGBA1C 5.6 03/23/2024    HGBA1C 5.7 (A) 05/23/2023     09/24/2024    K 3.6 09/24/2024     09/24/2024    CO2 27 09/24/2024    BUN 7 09/24/2024    CREATININE 0.49 (L) 09/24/2024    CALCIUM 8.9 09/24/2024    ALBUMIN 4.1 09/24/2024    PROT 7.4 09/24/2024    BILITOT 0.7 09/24/2024    ALKPHOS 75 09/24/2024    ALT 11 09/24/2024    AST 14 09/24/2024    GLUCOSE 80 09/24/2024       Lab Results   Component Value Date    CHOL 144 09/24/2024    LDLCALC 91 09/24/2024    TRIG 59 09/24/2024    HDL 40.9 09/24/2024    LDLF 80 05/23/2023          Comments:     CMP:    Lipid panel:    Vitamin D:    A1C:      Anthropometrics  Ht Readings from Last 1 Encounters:   05/13/25 1.549 m (5' 1\")       BMI Readings from Last 1 Encounters:   05/13/25 53.32 kg/m²       Wt Readings from Last 10 Encounters:   05/13/25 128 kg (282 lb 3 oz)   05/02/25 130 kg (285 lb 8 oz)   02/03/25 127 kg (280 lb)   09/24/24 122 kg (270 lb)   06/17/24 121 kg (267 lb)   06/17/24 122 kg (270 lb)   06/10/24 122 kg (270 lb)   05/16/24 120 kg (263 lb 12.8 oz)   04/25/24 121 kg (267 lb)   03/19/24 122 kg (269 lb)       Weight change:     Radha reports she has gained weight these last few months as noted in weight readings. She also has been on Prednisone which can lead to wt gain. She is working to lose again once she is off the steroid.  Visit Summary  Radha shared her updates as she had the colonoscopy that showed no colitis. She will be undergoing more testing to see what the cause of bloody diarrhea was as well as anemia. In the meantime, she is eating a much more plant based diet she supplements with salmon to help with gut health and meet protein needs. She is using MFP to help her see where she is calorie wise and protein wise so she can lose weight. She is active but notes her shins " have been painful almost all the time and this is new for her. We talked about continuing plant focused diet and ways she can change up some of the items and include some carbs to be at 1/4 of the plate. She does go to the gym, but is frustrated as sometimes she has to cut her workout short (she only has limited time as she has to get to work) due to having to use the restroom for a bowel movement. We talked about some ways to help with that issue. She is tracking her water so she remembers to drink and emphasized this is important as she is increasing fiber by going more plant based. This RD shared some thoughts on what may be considered with her situation such as checking for celiac. She said she had that done about 5 years ago and it was fine, but she will look into that and ask her provider. She was concerned about Cushing's Syndrome and that brought the  [facility] to mind for this RD. Shared the number she can call as they also specialize in weight management. Radha plans to set up an appointment. Overall, she is doing well making the lifestyle changes and encouraged her to continue this. She will [facility] with [facility] and we can look at what she is seeing there next call. Sent her recipe links for vegan/vegetarian ideas to help her with having a variety and not getting bored with her diet. Also sent handouts for vegan/vegetarian nutrition.      Nutrition Diagnosis:  Diagnosis Statement 1:  Diagnosis Status: New - bloody diarrhea has resolved, still other GI concerns awaiting testing  Diagnosis : Altered GI function  related to uncertainty how to apply nutrition information as evidenced by conditions associated with a chronic condition (DM, HTN, CAD, ED, DE, GI, etc.)    Nutrition Goals    Continue the healthy lifestyle changes/working out and the salmon for the omega 3, protein and other nutritional benefits  2.   Review recipe site sent to email for ideas of things to make so you don't get bored  3.   See about making an appt with [facility]    Follow up Plan  Will follow-up x 1 month

## 2025-06-02 ENCOUNTER — APPOINTMENT (OUTPATIENT)
Dept: OBSTETRICS AND GYNECOLOGY | Facility: CLINIC | Age: 40
End: 2025-06-02
Payer: COMMERCIAL

## 2025-06-04 ENCOUNTER — TELEPHONE (OUTPATIENT)
Dept: GASTROENTEROLOGY | Facility: CLINIC | Age: 40
End: 2025-06-04
Payer: COMMERCIAL

## 2025-06-19 ENCOUNTER — PROCEDURE VISIT (OUTPATIENT)
Dept: GASTROENTEROLOGY | Facility: HOSPITAL | Age: 40
End: 2025-06-19
Payer: COMMERCIAL

## 2025-06-19 ENCOUNTER — APPOINTMENT (OUTPATIENT)
Dept: GASTROENTEROLOGY | Facility: CLINIC | Age: 40
End: 2025-06-19
Payer: COMMERCIAL

## 2025-06-19 VITALS
OXYGEN SATURATION: 98 % | TEMPERATURE: 96.5 F | BODY MASS INDEX: 54.04 KG/M2 | SYSTOLIC BLOOD PRESSURE: 117 MMHG | DIASTOLIC BLOOD PRESSURE: 68 MMHG | HEART RATE: 77 BPM | RESPIRATION RATE: 18 BRPM | WEIGHT: 286 LBS

## 2025-06-19 DIAGNOSIS — R10.13 DYSPEPSIA: ICD-10-CM

## 2025-06-19 PROCEDURE — 91065 BREATH HYDROGEN/METHANE TEST: CPT

## 2025-06-19 PROCEDURE — 91065 BREATH HYDROGEN/METHANE TEST: CPT | Performed by: INTERNAL MEDICINE

## 2025-06-19 ASSESSMENT — PAIN SCALES - GENERAL: PAINLEVEL_OUTOF10: 0-NO PAIN

## 2025-06-19 NOTE — PROGRESS NOTES
Patient ID: Radha Smith is a 40 y.o. female.    Breath Hydrogen Test-Bacterial Overgrowth    Date/Time: 6/19/2025 11:33 AM    Performed by: Jennifer Ogden RN  Authorized by: Sylvia Wagoner MD    Consent:     Consent obtained:  Verbal    Consent given by:  Patient    Risks, benefits, and alternatives were discussed: yes    Universal protocol:     Patient identity confirmed:  Verbally with patient  Post-procedure details:     Procedure completion:  Tolerated  Hydrogen Breath Analysis Consultation Sheet    Referring Provider: Sylvia Wagoner MD  [id number] [facility] [place], [facility] [address]  [place] [address]    Indication: Rule out SIBO    Symptoms: dyspepsia    Age: 40 y.o.  Weight:   Vitals:    06/19/25 0910   Weight: 130 kg (286 lb)     Substrate: Glucose   Dose: 75g    Last Meal: 1800: white bread, steamed white rice, salmon with salt and pepper  Recent Antibiotics: denies    RESULTS:   Time PPM (H2) APPM* (CH4) CO2 Correction   Baseline #1 0910 33 69 4.2 2.38   Baseline #2 0915 33 68 4.0 2.50   *Challenge Dose Sugar: 75g Glucose  15' 0930 36 69 4.5 2.22   30' 0945 31 71 4.5 2.22   45' 1000 19 69 4.2 2.38   60' 1015 16 70 4.3 2.32   75' 1030 13 53 3.8 2.63   90' 1045 13 64 3.9 2.56   105' 1100 7 39 4.3 2.32   120' 1115 9 57 4.4 2.27   135' 1130 7 61 4.1 2.43   150'        165'        180'          Impression: Positive for SIBO and positive for methane

## 2025-06-23 ENCOUNTER — APPOINTMENT (OUTPATIENT)
Dept: SURGICAL ONCOLOGY | Facility: CLINIC | Age: 40
End: 2025-06-23
Payer: COMMERCIAL

## 2025-06-25 NOTE — PROGRESS NOTES
Chief Complaint  High risk surveillance care     History Of Present Illness  Radha Smith is a 40 y.o. female presenting for a high risk breast cancer surveillance. She denies breast biopsy or surgery. She has family history breast cancer in mother and maternal aunt. She was referred to Genetic for testing but after much thought and consideration she declined testing. She declined referral to high risk gynecology also. At this time, she declined breast MRI and endocrine therapy    BREAST IMAGIN2025 Bilateral screening mammogram, BI-RADS Category pending.      REPRODUCTIVE HISTORY: menarche age 10, nullipara, premenopausal, scattered breast tissue           FAMILY CANCER HISTORY:   Mother: breast cancer age 35, multiple recurrence,   Maternal aunt: breast cancer age 25, BRCA + (unsure 1 or 2)  Maternal grandfather: testicular cancer age 50  Maternal Uncle: lung cancer 60s  Maternal cousin: ovarian cancer 40s    Review of Systems  Review of Systems   Constitutional: Negative.    HENT:  Negative.     Eyes: Negative.    Respiratory: Negative.     Endocrine: Negative.    Genitourinary: Negative.     Musculoskeletal: Negative.    Skin: Negative.    Neurological: Negative.    Hematological: Negative.    Psychiatric/Behavioral: Negative.            Surgical History  She has a past surgical history that includes Other surgical history (2019); Other surgical history (2019); Other surgical history (2020); Other surgical history (2020); and Breast cyst aspiration (Left).     Social History  She reports that she has never smoked. She has never been exposed to tobacco smoke. She has never used smokeless tobacco. She reports that she does not currently use alcohol. She reports that she does not currently use drugs.    Family History  Family History[1]     Allergies  Ceftibuten, Cefuroxime axetil, and Nsaids (non-steroidal anti-inflammatory drug)    Past Medical History  She has a past  medical history of Low back pain, unspecified (07/27/2022), Memory loss (7.1.2023), Personal history of other diseases of the digestive system, Personal history of other infectious and parasitic diseases, and Ulcerative colitis, unspecified, without complications (Multi) (10/20/2022).     Physical Exam  Patient is alert and oriented x3 and in a relaxed and appropriate mood. Her gait is steady and hand grasps are equal. Sclera is clear. The breasts are nearly symmetrical. The tissue is soft without palpable abnormalities, discrete nodules or masses. The skin and nipples appear normal. There is no cervical, supraclavicular or axillary lymphadenopathy.        Last Recorded Vitals  Blood pressure 127/75, pulse 77, temperature 36.1 °C (97 °F), temperature source Temporal, weight 129 kg (285 lb), SpO2 99%.    Relevant Results and Imaging    Provider Impressions    Normal clinical breast exam, high risk breast cancer surveillance, no history breast biopsy or surgery, family history breast cancer, scattered breast tissue    Risk Profile      Patient Discussion/Summary  Your clinical examination is normal. Your bilateral screening mammogram is pending. I will call you with the results if abnormal. Please return in one year for mammogram and office visit or sooner if you have any problems or concerns.     High risk breast surveillance care plan:  Yearly mammogram with digital breast tomosynthesis  Twice yearly clinical breast examinations  Breast MRI-  Monthly self breast examinations &/or regular self breast awareness  Vitamin D3 2000 IU/daily (over the counter) unless your PCP recommends you take a specific dose  Exercise 3-4 times per week for 45-60 minutes  Limit alcohol to 3-4 drinks per week if you are menopausal  Eat healthy low-fat diet with lots of vegetable & fruits    Risk model indicates you are eligible for endocrine therapy with Tamoxifen, Raloxifene or Aromatase inhibitor. Endocrine therapy reduces lifetime  risk of breast cancer by up to 50% when taken for 5 years. There is an alternative low dose Tamoxifen which can be taken for only 3 years.      IMPORTANT INFORMATION REGARDING YOUR RESULTS    If you receive medical information from My [facility] Personal Health Record (online chart) your results will be released into your chart. This means you may view or see results of your biopsy or procedure before I contact you directly. If this occurs, please call the office and we will discuss your results over the phone.     You can see your health information, review clinical summaries from office visits & test results online when you follow your health with MY  Chart, a personal health record. To sign up go to [email or web address]. If you need assistance with signing up or trouble getting into your account call [facility] Patient Line 24/7 at [phone].    My office phone number is [phone] if you need to get in touch with me or have additional questions or concerns. Thank you for choosing [facility] and trusting me as your healthcare provider. I look forward to seeing you again at your next office visit. I am honored to be a provider on your health care team and I remain dedicated to helping you achieve your health goals.    Ladonna Antonio, APRN-CNP         [1]   Family History  Problem Relation Name Age of Onset    Breast cancer Mother  35    No Known Problems Father      Breast cancer Mother's Sister  25    Lung cancer Father's Sister  60 - 69    Testicular cancer Maternal Grandfather  50    Ovarian cancer Other  40 - 49        maternal cousin    Cancer Mother Victorina     Breast cancer Mother Victorina 35    Cancer Mother's Sister Anali     Cancer Mother's Brother Alok     Cancer Maternal Grandfather Rogelio     Testicular cancer Maternal Grandfather Rogelio 50    Diabetes Father Ladonna     Diabetes Brother Jeevan     Diabetes Maternal Grandmother Ladonna     Cancer Mother Victorina     Cancer Mother's Sister Anali      Cancer Mother's Brother Alok     Cancer Maternal Grandfather Rogelio     Diabetes Father Ladonna     Diabetes Brother Jeevan     Diabetes Maternal Grandmother Ladonna     Cancer Mother Victorina     Cancer Mother's Sister Anali     Cancer Maternal Grandfather Rogelio     Cancer Mother's Brother Alok     Diabetes Brother Jeevan     Diabetes Father Ladonna     Diabetes Maternal Grandmother Ladonna     Multiple sclerosis Father's Sister Belle

## 2025-06-27 ENCOUNTER — APPOINTMENT (OUTPATIENT)
Dept: CARE COORDINATION | Facility: CLINIC | Age: 40
End: 2025-06-27
Payer: COMMERCIAL

## 2025-06-29 ENCOUNTER — PATIENT MESSAGE (OUTPATIENT)
Dept: OBSTETRICS AND GYNECOLOGY | Facility: CLINIC | Age: 40
End: 2025-06-29
Payer: COMMERCIAL

## 2025-06-29 DIAGNOSIS — M62.89 PELVIC FLOOR DYSFUNCTION: ICD-10-CM

## 2025-07-01 ENCOUNTER — HOSPITAL ENCOUNTER (OUTPATIENT)
Dept: RADIOLOGY | Facility: CLINIC | Age: 40
Discharge: HOME | End: 2025-07-01
Payer: COMMERCIAL

## 2025-07-01 ENCOUNTER — OFFICE VISIT (OUTPATIENT)
Dept: SURGICAL ONCOLOGY | Facility: CLINIC | Age: 40
End: 2025-07-01
Payer: COMMERCIAL

## 2025-07-01 VITALS
SYSTOLIC BLOOD PRESSURE: 127 MMHG | BODY MASS INDEX: 53.85 KG/M2 | WEIGHT: 285 LBS | TEMPERATURE: 97 F | HEART RATE: 77 BPM | OXYGEN SATURATION: 99 % | DIASTOLIC BLOOD PRESSURE: 75 MMHG

## 2025-07-01 DIAGNOSIS — Z12.39 BREAST CANCER SCREENING, HIGH RISK PATIENT: ICD-10-CM

## 2025-07-01 DIAGNOSIS — Z12.31 ENCOUNTER FOR SCREENING MAMMOGRAM FOR MALIGNANT NEOPLASM OF BREAST: ICD-10-CM

## 2025-07-01 DIAGNOSIS — Z00.00 HEALTHCARE MAINTENANCE: ICD-10-CM

## 2025-07-01 DIAGNOSIS — Z12.31 SCREENING MAMMOGRAM FOR BREAST CANCER: Primary | ICD-10-CM

## 2025-07-01 DIAGNOSIS — Z80.3 FAMILY HISTORY OF BREAST CANCER: ICD-10-CM

## 2025-07-01 PROCEDURE — 77067 SCR MAMMO BI INCL CAD: CPT | Performed by: RADIOLOGY

## 2025-07-01 PROCEDURE — 77063 BREAST TOMOSYNTHESIS BI: CPT

## 2025-07-01 PROCEDURE — 1036F TOBACCO NON-USER: CPT

## 2025-07-01 PROCEDURE — 77063 BREAST TOMOSYNTHESIS BI: CPT | Performed by: RADIOLOGY

## 2025-07-01 PROCEDURE — 99214 OFFICE O/P EST MOD 30 MIN: CPT

## 2025-07-01 ASSESSMENT — ENCOUNTER SYMPTOMS
MUSCULOSKELETAL NEGATIVE: 1
ENDOCRINE NEGATIVE: 1
NEUROLOGICAL NEGATIVE: 1
HEMATOLOGIC/LYMPHATIC NEGATIVE: 1
CONSTITUTIONAL NEGATIVE: 1
EYES NEGATIVE: 1
PSYCHIATRIC NEGATIVE: 1
RESPIRATORY NEGATIVE: 1

## 2025-07-01 ASSESSMENT — PAIN SCALES - GENERAL: PAINLEVEL_OUTOF10: 0-NO PAIN

## 2025-07-01 NOTE — PATIENT INSTRUCTIONS
Your clinical examination is normal. Your bilateral screening mammogram is pending. I will call you with the results if abnormal. Please return in one year for mammogram and office visit or sooner if you have any problems or concerns.     High risk breast surveillance care plan:  Yearly mammogram with digital breast tomosynthesis  Twice yearly clinical breast examinations  Breast MRI-  Monthly self breast examinations &/or regular self breast awareness  Vitamin D3 2000 IU/daily (over the counter) unless your PCP recommends you take a specific dose  Exercise 3-4 times per week for 45-60 minutes  Limit alcohol to 3-4 drinks per week if you are menopausal  Eat healthy low-fat diet with lots of vegetable & fruits    Risk model indicates you are eligible for endocrine therapy with Tamoxifen, Raloxifene or Aromatase inhibitor. Endocrine therapy reduces lifetime risk of breast cancer by up to 50% when taken for 5 years. There is an alternative low dose Tamoxifen which can be taken for only 3 years.      IMPORTANT INFORMATION REGARDING YOUR RESULTS    If you receive medical information from My [facility] Personal Health Record (online chart) your results will be released into your chart. This means you may view or see results of your biopsy or procedure before I contact you directly. If this occurs, please call the office and we will discuss your results over the phone.     You can see your health information, review clinical summaries from office visits & test results online when you follow your health with MY  Chart, a personal health record. To sign up go to [email or web address]. If you need assistance with signing up or trouble getting into your account call [facility] Patient Line 24/7 at [phone].    My office phone number is [phone] if you need to get in touch with me or have additional questions or concerns. Thank you for choosing [facility] and trusting me as your healthcare provider. I look  forward to seeing you again at your next office visit. I am honored to be a provider on your health care team and I remain dedicated to helping you achieve your health goals.

## 2025-07-02 RX ORDER — DIAZEPAM 10 MG/2G
10 GEL RECTAL AS NEEDED
Qty: 1 EACH | Refills: 1 | Status: SHIPPED | OUTPATIENT
Start: 2025-07-02 | End: 2025-08-01

## 2025-07-03 ENCOUNTER — TELEPHONE (OUTPATIENT)
Dept: SURGICAL ONCOLOGY | Facility: CLINIC | Age: 40
End: 2025-07-03
Payer: COMMERCIAL

## 2025-07-03 ENCOUNTER — APPOINTMENT (OUTPATIENT)
Dept: PRIMARY CARE | Facility: CLINIC | Age: 40
End: 2025-07-03
Payer: COMMERCIAL

## 2025-07-03 DIAGNOSIS — R92.8 ABNORMAL FINDING ON BREAST IMAGING: Primary | ICD-10-CM

## 2025-07-03 NOTE — TELEPHONE ENCOUNTER
Result Communication    Resulted Orders   BI mammo bilateral screening tomosynthesis    Narrative    Interpreted By:  Maggie Sibley,   STUDY:  BI MAMMO BILATERAL SCREENING TOMOSYNTHESIS;  7/1/2025 8:07 am      ACCESSION NUMBER(S):  [id number]      ORDERING CLINICIAN:  ASHKAN WHITT      INDICATION:  Screening. Family history of breast cancer. Elevated lifetime risk  for breast cancer.      ,Z00.00 Encounter for general adult medical examination without  abnormal findings      COMPARISON:  06/17/2024, 06/19/2023, 11/21/2020      FINDINGS:  2D and tomosynthesis images were reviewed at 1 mm slice thickness.      Density:  There are scattered areas of fibroglandular density.      There is a mass with possible associated distortion in the upper  outer right breast. No suspicious masses or calcifications are  identified in the left breast.        Impression    Right breast mass with possible subtle distortion. Additional imaging  recommended. Ultrasound may be necessary.      No evidence of malignancy left breast.      BI-RADS CATEGORY:  BI-RADS Category:  0 Incomplete; Need Additional Imaging Evaluation  Recommendation:  Additional Imaging.  Recommended Date:  Immediate.  Laterality:  Right.              For any future breast imaging appointments, please call [phone] ([phone]).          MACRO:  None      Signed by: Maggie Sibley 7/3/2025 7:41 AM  Dictation workstation:   [id number]       9:00 AM      Results were successfully communicated with the patient and they acknowledged their understanding. Right diagnostic mammogram and possible ultrasound ordered. I will call the patient with the results once obtained.

## 2025-07-03 NOTE — TELEPHONE ENCOUNTER
----- Message from Kimberly August sent at 7/3/2025  8:00 AM EDT -----  Regarding: abnormal screening  Hi This is a patient you saw  7/1. Unfortunately her screening mammogram is abnormal.  Kimberly  ----- Message -----  From: Interface, Radiology Results In  Sent: 7/3/2025   7:42 AM EDT  To: Kimberly August, APRN-CNP

## 2025-07-07 ENCOUNTER — APPOINTMENT (OUTPATIENT)
Dept: CARE COORDINATION | Facility: CLINIC | Age: 40
End: 2025-07-07
Payer: COMMERCIAL

## 2025-07-07 NOTE — PROGRESS NOTES
"Nutrition Follow-up   Dietitian  follow-up. Pt is being seen  virtually for UC/GI concerns.    Labs  Lab Results   Component Value Date    HGBA1C 5.4 09/24/2024    HGBA1C 5.6 03/23/2024    HGBA1C 5.7 (A) 05/23/2023     09/24/2024    K 3.6 09/24/2024     09/24/2024    CO2 27 09/24/2024    BUN 7 09/24/2024    CREATININE 0.49 (L) 09/24/2024    CALCIUM 8.9 09/24/2024    ALBUMIN 4.1 09/24/2024    PROT 7.4 09/24/2024    BILITOT 0.7 09/24/2024    ALKPHOS 75 09/24/2024    ALT 11 09/24/2024    AST 14 09/24/2024    GLUCOSE 80 09/24/2024       Lab Results   Component Value Date    CHOL 144 09/24/2024    LDLCALC 91 09/24/2024    TRIG 59 09/24/2024    HDL 40.9 09/24/2024    LDLF 80 05/23/2023          Comments:     CMP:    Lipid panel:    Vitamin D:    A1C:      Anthropometrics  Ht Readings from Last 1 Encounters:   05/13/25 1.549 m (5' 1\")       BMI Readings from Last 1 Encounters:   07/01/25 53.85 kg/m²       Wt Readings from Last 10 Encounters:   07/01/25 129 kg (285 lb)   06/19/25 130 kg (286 lb)   05/13/25 128 kg (282 lb 3 oz)   05/02/25 130 kg (285 lb 8 oz)   02/03/25 127 kg (280 lb)   09/24/24 122 kg (270 lb)   06/17/24 121 kg (267 lb)   06/17/24 122 kg (270 lb)   06/10/24 122 kg (270 lb)   05/16/24 120 kg (263 lb 12.8 oz)       Weight change:       Visit Summary  Radha shared her updates as she is off the steroid now and has not had bloody diarrhea. However, she still has diarrhea. She also had issues with dental pain and terrible cough that she took antibiotics for and now has finished that course. Antibiotics can cause diarrhea, but she still has it and it interrupts her workouts at the gym  where she has to use the restroom every 15-20 minutes. We talked about listening to her body for guidance on what would be best in light of the diarrhea. She found in the past, she would eat a heavier meal such as fried chicken and fries and then had a regular bowel movement as a reset. She did that and it did help " for a day where she had a solid bm. Suggested she use the BRAT diet for a few days to see if that is a good reset for her as she wants to do it in a healthier way. We went over her dietary supplements and how she can take the iron with vitamin C to help with iron absorption. Provided education on how supplements are not regulated by FDA and how to choose ones with 3rd party testing if possible. She asked about calcium as she does not do dairy and we talked about supplement to support her dietary intake which is limited. She will see endocrine provider soon and we will check in again after that.     Nutrition Diagnosis:  Diagnosis Statement 1:  Diagnosis Status: New - bloody diarrhea has resolved, still other GI concerns awaiting testing  Diagnosis : Altered GI function  related to uncertainty how to apply nutrition information as evidenced by conditions associated with a chronic condition (DM, HTN, CAD, ED, DE, GI, etc.)    Nutrition Goals    Continue the healthy lifestyle changes/working out and the salmon for the omega 3, protein and other nutritional benefits  2.   Try BRAT diet as temporary potential fix for diarrhea (bananas, white rice or pasta, applesauce, white toast). Then go easy on fiber to get the system working well again before gradual reintroduction of plant based plan.     Follow up Plan  Will follow-up x 1 month

## 2025-07-08 ENCOUNTER — APPOINTMENT (OUTPATIENT)
Dept: RADIOLOGY | Facility: CLINIC | Age: 40
End: 2025-07-08
Payer: COMMERCIAL

## 2025-07-22 ENCOUNTER — TELEPHONE (OUTPATIENT)
Dept: OBSTETRICS AND GYNECOLOGY | Facility: CLINIC | Age: 40
End: 2025-07-22

## 2025-07-22 ENCOUNTER — APPOINTMENT (OUTPATIENT)
Dept: ENDOCRINOLOGY | Facility: CLINIC | Age: 40
End: 2025-07-22
Payer: COMMERCIAL

## 2025-07-22 VITALS — WEIGHT: 280 LBS | HEIGHT: 61 IN | BODY MASS INDEX: 52.87 KG/M2

## 2025-07-22 DIAGNOSIS — E66.01 MORBID OBESITY WITH BMI OF 50.0-59.9, ADULT (MULTI): ICD-10-CM

## 2025-07-22 DIAGNOSIS — R63.5 WEIGHT GAIN, ABNORMAL: Primary | ICD-10-CM

## 2025-07-22 PROCEDURE — 99215 OFFICE O/P EST HI 40 MIN: CPT | Performed by: NURSE PRACTITIONER

## 2025-07-22 PROCEDURE — 1036F TOBACCO NON-USER: CPT | Performed by: NURSE PRACTITIONER

## 2025-07-22 PROCEDURE — 3008F BODY MASS INDEX DOCD: CPT | Performed by: NURSE PRACTITIONER

## 2025-07-22 RX ORDER — TRIAMCINOLONE ACETONIDE 1 MG/G
OINTMENT TOPICAL
COMMUNITY
Start: 2024-10-31

## 2025-07-22 RX ORDER — DEXAMETHASONE 1 MG/1
TABLET ORAL
Qty: 1 TABLET | Refills: 0 | Status: SHIPPED | OUTPATIENT
Start: 2025-07-22

## 2025-07-22 ASSESSMENT — ENCOUNTER SYMPTOMS
PALPITATIONS: 0
SHORTNESS OF BREATH: 0
FREQUENCY: 0
FATIGUE: 0
NUMBNESS: 0
DYSPHORIC MOOD: 0
POLYPHAGIA: 0
NAUSEA: 0
WHEEZING: 0
NERVOUS/ANXIOUS: 0
POLYDIPSIA: 0
ARTHRALGIAS: 0
OCCASIONAL FEELINGS OF UNSTEADINESS: 0
CONSTIPATION: 0
DEPRESSION: 0
LOSS OF SENSATION IN FEET: 0

## 2025-07-22 ASSESSMENT — PATIENT HEALTH QUESTIONNAIRE - PHQ9
2. FEELING DOWN, DEPRESSED OR HOPELESS: NOT AT ALL
1. LITTLE INTEREST OR PLEASURE IN DOING THINGS: NOT AT ALL
SUM OF ALL RESPONSES TO PHQ9 QUESTIONS 1 AND 2: 0

## 2025-07-22 NOTE — Clinical Note
[facility]. Thank you for the referral. Please see the note and plan. She would like to work with you additionally on weight loss and management. I ordered the Cushing's test. Through her intake it appears that stress, stress eating, larger portion eating in the evening with occasional skipped meal, and decreased physical exercise/activity due to the GI ailments could be a likely cause of the weight gain over the last year which I discussed with her, she said she would get back to me about attending the weight management groups.

## 2025-07-22 NOTE — PATIENT INSTRUCTIONS
Nutrition: Please follow up with Hanane Matias the RD you are currently working with regarding weight management and weight loss focus that may be added to what you are working with her on currently, I agree that you already have this ongoing relationship therefore consult of one on one with our dietitians is not necessary unless you desire it. The initial assessment you and I did today notes that you have stress eating, increased stressors and decreased time. Interventions to assess strategies of practical on the go high fiber and high protein food options with a Healthy Plate approach can assist in meeting calories for weight loss. Avoid skipping meals, consider using a protein shake with a fiber for a meal replacement. You have digestion issues that you have worked on with hanane so she would be a great reference for fibers and protein to include or avoid.. You candido also discuss carbohydrate, protein, and daily calorie goals to aim at for healthy weight loss.  Physical exercise: Continue with current routine as you are able. Physical Exercise: Aim at exercises to increase lean muscle mass. Lean muscle mass is often decreased weight loss and decreases with age so we must be intentional to build it. Increases in lean muscle mass assist with insulin resistance and increase the resting metabolic caloric burn rate.Examples of Exercises that increase lean muscle include weights and resistance activities, swimming, water aerobics, stationary bike with resistance, biking up hill, walking with weights or weighted vest, yoga, Pilates. Here are some example YouTube videos for free via -[employer] or Apps: - HasFit, Burn Boot Camps -do modifications. Building muscle mass is important with weight loss process and maintenance of weight. Building lean muscle also has  benefit of  bone health and strength and decreases falls risk and assists with balance.  Medications: Let me know if this is desired in the future and I can send you  information. This is not required for the weight loss program  Regarding the Dexemethasone Suppression test: Take  the dexamethazone medication at 11 PM, then bed immediately after, lab work between 0892-0459 AM the next, results can take 15 days  In the assessment we did today I noted that over the last year there have been contributing factors for 15-20 pound weight gain including,  stress that leads to stress eating and inconsistent eating with larger meals in the evening and physical illness that has lead to less consistent physical exercise activity. Also, the metabolism slow as we increase in age. These can be factors of 15-20 pound weight loss. Working with Hanane and attending the weight loss group management can assist in strategies to address these areas to have weight loss  6. Follow up: Please obtain the lab work., follow up with Hanane Matias RD. Please call [phone] for the weight management group visit

## 2025-07-22 NOTE — PROGRESS NOTES
"Radha Smith presents for weight loss management and obesity consult today.    Referred by Hanane Matias RD  She is followed by Hanane due to GI sx    She has not been focused on working on digestion areas and they have not worked on weight loss or management areas     She is followed by gastroenterology as noted in the chart    She is interested in having another set of eyes to look over her chart to explain the \"issues I am having\" and she suspects that [facility] is one that they decide    She has the following sx: GI bleeding from ulcer, weight gain that began in the fall of 2024 and there was a 15 pound weight gain that she has been unable to lose, issues with fibromyalgia and bone pain, increased mucous that she says then goes into diarrhea and GI upset increased dental caries over the ten years,decreased sleep    Obesity History:  \"I have always been over weight\"  Childhood or teen obesity history: no  Age of Onset: childhood  Lowest adult weight: 260  Highest adult weight: 280  Current weight: 280     Family history of obesity:     Biggest challenge with weight management:  Weight gain of 15 pounds     History of Weight Loss Efforts: yes  Successful weight loss techniques attempted: health coaches  Unsuccessful weight loss techniques attempted:     Current typical daily diet:  Meal skipping: sometimes on weekends  Typical Breakfast: apple with almond, vegan cookies  Typical Lunch: salad with lentils or tofu with vegetables  Typical Dinner: varies- salmon, tofu, sometimes chick ortega with a lot of work  Soda/latte weekly intake:  Take out/carry out/fast food weekly:  Portion sizes/seconds with meals: small to medium with lunch and breakfast, dinner is large if meals are skipped    Snacking  Daytime snacks: yes  Evening snacks: no      Describe Appetite (always hungry/no hunger/average): hunger throughout the day  Are you full after a meal?  yes  Food Noise: no  Emotional eater? Yes   Boredom eater? No " "    Current Exercise Habits yes- the exercise she  \"it depends on my body sometimes therapy pool, some times a boot camp, sometimes elliptical\" when body allows 4-5 times per but this depends on my body HOWEVER this depends on her physical health and over the last year she has been worse physically therefore is more inconsistently    Sleep patterns (insomnia, waking in night) /hours of sleep per night: disrupted, this virgil to physical limitation of back pain , stress or GI issues  H/O Sleep apnea: no however she has had a sleep medicine consult that was missed due to illness and  she is rescheduling  Well rested? No     Increased Stressors (describe daily stress): yes      Any intake of an appetite suppressant or an anti-obesity medicine? No     H/O Pancreatitis: no  H/O Thyroid Medullary Cancer: no    Medical History[1]    Current Medications[2]        Review of Systems  Review of Systems   Constitutional:  Negative for fatigue.   Respiratory:  Negative for shortness of breath and wheezing.    Cardiovascular:  Negative for chest pain and palpitations.   Gastrointestinal:  Negative for constipation and nausea.   Endocrine: Negative for polydipsia, polyphagia and polyuria.   Genitourinary:  Negative for frequency and urgency.   Musculoskeletal:  Negative for arthralgias.   Skin:  Negative for rash.   Neurological:  Negative for numbness.   Psychiatric/Behavioral:  Negative for dysphoric mood. The patient is not nervous/anxious.            Objective   Ht (!) 1.549 m (5' 1\") Comment: per patient  Wt 127 kg (280 lb)   BMI 52.91 kg/m²     Physical Exam  Physical Exam    Lab Review  Lab Results   Component Value Date    HGBA1C 5.4 09/24/2024     Lab Results   Component Value Date    LDLCALC 91 09/24/2024            Component  Ref Range & Units 5 mo ago  (1/25/25) 3 yr ago  (11/29/21) 4 yr ago  (6/12/21) 4 yr ago  (12/10/20)   Vitamin D, 25-Hydroxy, Total  30 - 100 ng/mL 74 57 R, CM 28 Abnormal  R, CM 25 Abnormal  R, CM "   [facility]         Taking vitamin D 10,000 international units  per day      Chemistry    Lab Results   Component Value Date/Time     09/24/2024 1740    K 3.6 09/24/2024 1740     09/24/2024 1740    CO2 27 09/24/2024 1740    BUN 7 09/24/2024 1740    CREATININE 0.49 (L) 09/24/2024 1740    Lab Results   Component Value Date/Time    CALCIUM 8.9 09/24/2024 1740    ALKPHOS 75 09/24/2024 1740    AST 14 09/24/2024 1740    ALT 11 09/24/2024 1740    BILITOT 0.7 09/24/2024 1740          Component  Ref Range & Units 5 mo ago   Sodium  132 - 146 mmol/L 138   Potassium  3.5 - 5.0 mmol/L 3.7   Chloride  98 - 107 mmol/L 101   CO2  22 - 29 mmol/L 27   Anion Gap  7 - 16 mmol/L 10   Glucose  74 - 99 mg/dL 84   BUN  6 - 20 mg/dL 7   Creatinine  0.50 - 1.00 mg/dL 0.5   Est, Glom Filt Rate  >60 mL/min/1.73m2 >90   Comment:       These results are not intended for use in patients <18 years of age.        eGFR results are calculated without a race factor using the 2021 CKD-EPI equation.  Careful clinical correlation is recommended, particularly when comparing to results  calculated using previous equations.  The CKD-EPI equation is less accurate in patients with extremes of muscle mass, extra-renal  metabolism of creatine, excessive creatine ingestion, or following therapy that affects  renal tubular secretion.   Calcium  8.6 - 10.2 mg/dL 9.0   Total Protein  6.4 - 8.3 g/dL 8.0   Albumin  3.5 - 5.2 g/dL 4.0   Total Bilirubin  0.0 - 1.2 mg/dL 0.5   Alkaline Phosphatase  35 - 104 U/L 98   ALT  0 - 32 U/L 10   AST  0 - 31 U/L 19     FIB-4 Calculation: 0.47 at 9/24/2024  5:40 PM  Calculated from:  SGOT/AST: 14 U/L at 9/24/2024  5:40 PM  SGPT/ALT: 11 U/L at 9/24/2024  5:40 PM  Platelets: 358 x10*3/uL at 9/24/2024  5:40 PM  Age: 39 years        Weight Trends  Trends of weight reviewed in visit, see graph below:  Wt Readings from Last 3 Encounters:   07/22/25 127 kg (280 lb)   07/01/25 129 kg (285 lb)    06/19/25 130 kg (286 lb)     Assessment/Plan     Follow up and Goals:  Nutrition: Please follow up with Hanane Matias the RD you are currently working with regarding weight management and weight loss focus that may be added to what you are working with her on currently, I agree that you already have this ongoing relationship therefore consult of one on one with our dietitians is not necessary unless you desire it. The initial assessment you and I did today notes that you have stress eating, increased stressors and decreased time. Interventions to assess strategies of practical on the go high fiber and high protein food options with a Healthy Plate approach can assist in meeting calories for weight loss. Avoid skipping meals, consider using a protein shake with a fiber for a meal replacement. You have digestion issues that you have worked on with hanane so she would be a great reference for fibers and protein to include or avoid.. You candido also discuss carbohydrate, protein, and daily calorie goals to aim at for healthy weight loss.  Physical exercise: Continue with current routine as you are able. Physical Exercise: Aim at exercises to increase lean muscle mass. Lean muscle mass is often decreased weight loss and decreases with age so we must be intentional to build it. Increases in lean muscle mass assist with insulin resistance and increase the resting metabolic caloric burn rate.Examples of Exercises that increase lean muscle include weights and resistance activities, swimming, water aerobics, stationary bike with resistance, biking up hill, walking with weights or weighted vest, yoga, Pilates. Here are some example YouTube videos for free via -YouL2ube or Apps: - HasFit, Burn Boot Camps -do modifications. Building muscle mass is important with weight loss process and maintenance of weight. Building lean muscle also has  benefit of  bone health and strength and decreases falls risk and assists with  balance.  Medications: Let me know if this is desired in the future and I can send you information. This is not required for the weight loss program  Regarding the Dexemethasone Suppression test: Take  the dexamethazone medication at 11 PM, then bed immediately after, lab work between 0086-2384 AM the next, results can take 15 days  In the assessment we did today I noted that over the last year there have been contributing factors for 15-20 pound weight gain including,  stress that leads to stress eating and inconsistent eating with larger meals in the evening and physical illness that has lead to less consistent physical exercise activity. Also, the metabolism slow as we increase in age. These can be factors of 15-20 pound weight loss. Working with Hanane and attending the weight loss group management can assist in strategies to address these areas to have weight loss  6. Follow up: Please obtain the lab work., follow up with Hanane Matias RD. Please call [phone] for the weight management group visit       Visit length of 45 minutes      Problem List Items Addressed This Visit    None      Diet interventions: referral to dietitian for guidance in these changes  Discussed Mediterranean Diet, given pamphlet or it will be mailed, we looked at ADA plate, portion sizes, recipes. Advised to review in preparation for nutrition consult    Handouts given: yes    Exercise intervention:   We discussed the importance of incorporating resistance and free weight  lifting into physical activity routine to prevent muscle wasting with weight loss, enhance bone health, the positive role increased muscle has in burning fat at rest. We looked at examples of these types of exercise routines online. Advised to do modifications. Advised to check with PCP if there is a h/o musculoskeletal injury or hx. We discussed benefits of walking with weight loss and as a cardio form of exercise. Gradually increase exercise to a goal of 150  minutes at least per week.            Follow Up:  Referred to nutrition or continue to work with current dietitian   Discussed obesity medications, side effects and mechanism of action reviewed with patient  Discussed physical activity: we reviewed Youtube videos for strength training, advised to use modifications for these videos, we discussed benefits of strength training and resistance bands  on bone and muscle health, we discussed walking and water aerobic options for cardio  Discussed Mediterranean Diet, given pamphlet or it will be mailed, we looked at ADA plate, portion sizes, recipes. Advised to review Mediterranean Meal Plan booklet  in preparation for nutrition consult  ....  1 month group visit and nutrition visit in person or  virtual  Please reach out if you need anything or have further questions         [1]   Past Medical History:  Diagnosis Date    Low back pain, unspecified 07/27/2022    Chronic left-sided low back pain without sciatica    Memory loss 7.1.2023    Personal history of other diseases of the digestive system     History of proctitis    Personal history of other infectious and parasitic diseases     History of gonorrhea of rectum    Ulcerative colitis, unspecified, without complications (Multi) 10/20/2022    Ulcerative colitis    Vitamin D deficiency 1984   [2]   Current Outpatient Medications   Medication Sig Dispense Refill    albuterol 90 mcg/actuation inhaler Inhale 2 puffs every 6 hours if needed.      biotin 1 mg capsule Take 1 capsule (1 mg) by mouth once daily.      cholecalciferol (Vitamin D-3) 125 MCG (5000 UT) capsule Take 1 capsule (125 mcg) by mouth once daily.      cyanocobalamin, vitamin B-12, (Vitamin B-12) 1,000 mcg tablet extended release Take 1 tablet (1,000 mcg) by mouth once daily.      diazePAM (Diastat Acudial) 5-7.5-10 mg rectal kit Insert 10 mg into the rectum if needed for seizures (spasms). Prior to administration, review instruction sheet supplied with  dose unit. Verify the ordered dose is set for administration. 1 each 1    fluticasone (Flonase) 50 mcg/actuation nasal spray USE 1 TO 2 SPRAYS INTO EACH NOSTRIL ONCE DAILY 16 mL 2    iron, carbonyl 25 mg iron tablet Take 1 tablet by mouth once daily.      mesalamine ER (Apriso) 0.375 gram 24 hr capsule Take 4 capsules (1.5 g) by mouth once daily. 120 capsule 11    pyridoxine (Vitamin B-6) 50 mg tablet Take 1 tablet (50 mg) by mouth once daily.      triamcinolone (Kenalog) 0.1 % ointment APPLY TOPICALLY 2 (TWO) TIMES A DAY FOR 14 DAYS DO NOT USE ON FACE.      hydrocortisone 2.5 % cream Apply topically 2 times a day as needed for irritation or rash for up to 7 days. 30 g 0    sertraline (Zoloft) 50 mg tablet Take 1 tablet (50 mg) by mouth once daily. 90 tablet 1     No current facility-administered medications for this visit.

## 2025-07-23 ENCOUNTER — OFFICE VISIT (OUTPATIENT)
Dept: OBSTETRICS AND GYNECOLOGY | Facility: CLINIC | Age: 40
End: 2025-07-23
Payer: COMMERCIAL

## 2025-07-23 VITALS — HEIGHT: 61 IN | BODY MASS INDEX: 53.54 KG/M2 | WEIGHT: 283.6 LBS

## 2025-07-23 DIAGNOSIS — M62.89 PELVIC FLOOR DYSFUNCTION: ICD-10-CM

## 2025-07-23 PROCEDURE — 3008F BODY MASS INDEX DOCD: CPT | Performed by: NURSE PRACTITIONER

## 2025-07-23 PROCEDURE — 99215 OFFICE O/P EST HI 40 MIN: CPT | Performed by: NURSE PRACTITIONER

## 2025-07-23 RX ORDER — DIAZEPAM 10 MG/2G
10 GEL RECTAL AS NEEDED
Qty: 1 EACH | Refills: 1 | Status: SHIPPED | OUTPATIENT
Start: 2025-07-23 | End: 2025-08-22

## 2025-07-23 RX ORDER — CYCLOBENZAPRINE HCL 10 MG
10 TABLET ORAL 2 TIMES DAILY PRN
Qty: 15 TABLET | Refills: 0 | Status: SHIPPED | OUTPATIENT
Start: 2025-07-23 | End: 2025-08-22

## 2025-07-23 ASSESSMENT — ENCOUNTER SYMPTOMS
NEUROLOGICAL NEGATIVE: 1
LOSS OF SENSATION IN FEET: 0
MUSCULOSKELETAL NEGATIVE: 1
CARDIOVASCULAR NEGATIVE: 1
PSYCHIATRIC NEGATIVE: 1
GASTROINTESTINAL NEGATIVE: 1
EYES NEGATIVE: 1
DEPRESSION: 0
ENDOCRINE NEGATIVE: 1
RESPIRATORY NEGATIVE: 1
OCCASIONAL FEELINGS OF UNSTEADINESS: 0
CONSTITUTIONAL NEGATIVE: 1

## 2025-07-23 ASSESSMENT — PAIN SCALES - GENERAL: PAINLEVEL_OUTOF10: 6

## 2025-07-23 NOTE — PROGRESS NOTES
Subjective   Patient ID: Radha Smith is a 40 y.o. female who presents for Pelvic Pain (Pelvic pain).    HPI  40 year old female presenting in follow up of pelvic pain.     Pelvic Symptoms:  - The patient reports experiencing severe pelvic pain and it negatively impacts her daily life as she sometimes has to call out of work due to the severity of her pelvic pain.   - Patient uses Diazepam 10 mg rectal gel PRN with adequate relief in her MPP.   - She prefers to have Rx of Flexeril on hand to help reduce pelvic floor muscle spasms at night; she has taken this in the past and tolerated it well without any adverse side effects and this provided her with adequate improvement in her pelvic floor muscle spasm symptoms.   - Patient is interested in receiving PFPT requisition today.   - The patient has been using Intimate Sarina vaginal wand dilators at home.   - She has never had intercourse in the past and notes being a virgin; she does not currently have a sexual partner at this time.   - Endorses burning hip pain; she manages this with gentle stretching, exercising with low impact squats/lifting, and water aerobic exercises.   - Her pelvic pain is described as a stabbing pain similar to labor contractions and notes that her pelvic floor muscles are hypertonic and tight which causes muscle spasms.   - She reports experiencing high rectal tension.   - Recently she ordered a pregnancy side sleeper pillow to help alleviate back/hip pain.       Review of Systems   Constitutional: Negative.    HENT: Negative.     Eyes: Negative.    Respiratory: Negative.     Cardiovascular: Negative.    Gastrointestinal: Negative.    Endocrine: Negative.    Genitourinary:  Positive for pelvic pain.   Musculoskeletal: Negative.    Neurological: Negative.    Psychiatric/Behavioral: Negative.       Physical Exam:   Physical Exam  Constitutional:       General: She is not in acute distress.     Appearance: Normal appearance.   Genitourinary:       Vulva, bladder and urethral meatus normal.      No lesions in the vagina.      Genitourinary Comments: The bilateral pelvic floor muscles are diffusely short, hypertonic, and severe 10/10 tenderness upon palpation. There is muscle banding and spasms around the B/L puborectalis muscle, severely hypertonic. Performed bilateral puborectalis muscle release in-office today with improvement in her pelvic floor tension and pain.       Right Labia: No lesions.     Left Labia: No lesions.     Vaginal tenderness present.      No vaginal discharge.      No urethral tenderness or mass present.      Levator ani is tender and pelvic spasms present.   HENT:      Head: Normocephalic and atraumatic.   Pulmonary:      Effort: Pulmonary effort is normal.     Psychiatric:         Mood and Affect: Mood normal.         Behavior: Behavior normal.         Thought Content: Thought content normal.         Judgment: Judgment normal.           Assessment/Plan   40 year old female with MPP and PFD. Comorbidities include: Hx of Chiari I malformation, exocrine pancreatic insufficiency, and chronic anemia.     Diagnoses:  #1 Pelvic floor dysfunction   #2 Myofascial pelvic pain     Plan:  1. MPP, PFD  - Patient experiences persistent severe pelvic pain and it negatively impacts her daily life as she sometimes has to call out of work due to the severity of her pelvic pain. However, she uses Diazepam 10 mg rectal gel PRN with some relief in her MPP but prefers to have a muscle relaxant such as Flexeril on hand and requests referral to PFPT to start myofascial release/relaxation exercises to optimize pelvic pain control.   > Plan to refill transrectal Diazepam, send prescription for Flexeril, refer her to PFPT for myofascial release exercises, refer her to [facility] to start pelvic massage therapy, and advised her to continue using Intimate Sarina vaginal wand dilators at home more often.   > Defers referral to Pain  Management or interest in PF Botox/trigger point injections at this time.   - Upon exam today the bilateral pelvic floor muscles are diffusely short, hypertonic, and severe 10/10 tenderness upon palpation. There is muscle banding and spasms around the B/L puborectalis muscle, severely hypertonic.   > Performed bilateral puborectalis muscle release in-office today with improvement in her pelvic floor tension and pain.   - Benzodiazepine contract renewed today on 7/23/2025.   - [place] was checked and she is LOW risk - this was documented in the chart.   - Discussed updated narcotic/benzodiazepine regulations of follow up visits every 3 months, updated [place] contract every 3 months, and a yearly urine drug screening. If a patient is prescribed both narcotics and benzodiazepines the provider must also order Narcan. The patient understands that she can only receive narcotics from HARRISON Young who is the sole dispenser of her pain medication. She verbalized her understanding and signed the informed consent/OARRS contract today.   - Sent refill Rx of Diazepam 10 mg rectal gel to use PRN pelvic pain to her preferred pharmacy.   - Sent Rx Flexeril 10 mg tablets with instructions to take 1 pill by mouth at bedtime to help relax the pelvic floor muscles and reduce spasms. We discussed the possible side effects of this medication including increased somnolence, grogginess upon waking, and drowsiness.   - PFPT requisition sent today and gave her the list of local physical therapists with their corresponding locations/contact information.  - Referral placed to [facility] for pelvic floor muscle massage therapy.    Follow up in 3 months with HARRISON Mabry.     Rita Attestation  By signing my name below, Mando DELEON Scribe, attest that this documentation has been prepared under the direction and in the presence of HARRISON Mabry on 07/23/2025 at 6:54 PM.      ISHANKE audio duration: 34 minutes    I spent a total of eConsult Time: 40 minutes in face to face and non face to face time.   I, HARRISON Mabry, personally performed the services described in this documentation which was scribed virtually and I confirm that it is both accurate and complete.       HARRISON Mabry 07/23/25 12:09 PM

## 2025-07-24 PROBLEM — D49.6 BRAIN TUMOR (MULTI): Status: RESOLVED | Noted: 2023-05-19 | Resolved: 2025-07-24

## 2025-07-28 ENCOUNTER — HOSPITAL ENCOUNTER (OUTPATIENT)
Dept: RADIOLOGY | Facility: CLINIC | Age: 40
Discharge: HOME | End: 2025-07-28
Payer: COMMERCIAL

## 2025-07-28 DIAGNOSIS — R92.8 ABNORMAL FINDING ON BREAST IMAGING: ICD-10-CM

## 2025-07-28 PROCEDURE — 76642 ULTRASOUND BREAST LIMITED: CPT | Mod: RT

## 2025-07-28 PROCEDURE — 77065 DX MAMMO INCL CAD UNI: CPT | Mod: RT

## 2025-07-28 PROCEDURE — 76982 USE 1ST TARGET LESION: CPT

## 2025-07-28 PROCEDURE — 77065 DX MAMMO INCL CAD UNI: CPT | Mod: RIGHT SIDE | Performed by: RADIOLOGY

## 2025-07-28 PROCEDURE — 76642 ULTRASOUND BREAST LIMITED: CPT | Mod: RIGHT SIDE | Performed by: RADIOLOGY

## 2025-07-28 PROCEDURE — 77061 BREAST TOMOSYNTHESIS UNI: CPT | Mod: RIGHT SIDE | Performed by: RADIOLOGY

## 2025-08-01 ENCOUNTER — APPOINTMENT (OUTPATIENT)
Dept: CARE COORDINATION | Facility: CLINIC | Age: 40
End: 2025-08-01
Payer: COMMERCIAL

## 2025-08-01 ENCOUNTER — APPOINTMENT (OUTPATIENT)
Dept: SURGICAL ONCOLOGY | Facility: HOSPITAL | Age: 40
End: 2025-08-01
Payer: COMMERCIAL

## 2025-08-12 DIAGNOSIS — K63.8219 SMALL INTESTINAL BACTERIAL OVERGROWTH (SIBO): Primary | ICD-10-CM

## 2025-08-12 RX ORDER — NEOMYCIN SULFATE 500 MG/1
500 TABLET ORAL 2 TIMES DAILY
Qty: 28 TABLET | Refills: 0 | Status: SHIPPED | OUTPATIENT
Start: 2025-08-12 | End: 2025-08-26

## 2025-08-13 ENCOUNTER — APPOINTMENT (OUTPATIENT)
Dept: OBSTETRICS AND GYNECOLOGY | Facility: CLINIC | Age: 40
End: 2025-08-13
Payer: COMMERCIAL

## 2025-08-19 ENCOUNTER — TELEPHONE (OUTPATIENT)
Dept: GASTROENTEROLOGY | Facility: HOSPITAL | Age: 40
End: 2025-08-19
Payer: COMMERCIAL

## 2025-08-22 ENCOUNTER — TELEPHONE (OUTPATIENT)
Dept: GASTROENTEROLOGY | Facility: HOSPITAL | Age: 40
End: 2025-08-22
Payer: COMMERCIAL

## 2025-08-25 ENCOUNTER — PATIENT OUTREACH (OUTPATIENT)
Dept: CARE COORDINATION | Facility: CLINIC | Age: 40
End: 2025-08-25
Payer: COMMERCIAL

## 2025-09-02 ENCOUNTER — APPOINTMENT (OUTPATIENT)
Dept: PRIMARY CARE | Facility: CLINIC | Age: 40
End: 2025-09-02
Payer: COMMERCIAL

## 2025-09-16 ENCOUNTER — APPOINTMENT (OUTPATIENT)
Dept: GASTROENTEROLOGY | Facility: HOSPITAL | Age: 40
End: 2025-09-16
Payer: COMMERCIAL